# Patient Record
Sex: FEMALE | Race: BLACK OR AFRICAN AMERICAN | NOT HISPANIC OR LATINO | Employment: PART TIME | URBAN - METROPOLITAN AREA
[De-identification: names, ages, dates, MRNs, and addresses within clinical notes are randomized per-mention and may not be internally consistent; named-entity substitution may affect disease eponyms.]

---

## 2020-03-23 ENCOUNTER — PROCEDURE VISIT (OUTPATIENT)
Dept: OBSTETRICS AND GYNECOLOGY | Facility: CLINIC | Age: 30
End: 2020-03-23

## 2020-03-23 ENCOUNTER — INITIAL PRENATAL (OUTPATIENT)
Dept: OBSTETRICS AND GYNECOLOGY | Facility: CLINIC | Age: 30
End: 2020-03-23

## 2020-03-23 VITALS — WEIGHT: 137 LBS | SYSTOLIC BLOOD PRESSURE: 112 MMHG | DIASTOLIC BLOOD PRESSURE: 74 MMHG

## 2020-03-23 DIAGNOSIS — Z34.81 MULTIGRAVIDA IN FIRST TRIMESTER: Primary | ICD-10-CM

## 2020-03-23 DIAGNOSIS — Z98.891 PREVIOUS CESAREAN SECTION: ICD-10-CM

## 2020-03-23 DIAGNOSIS — Z3A.08 8 WEEKS GESTATION OF PREGNANCY: ICD-10-CM

## 2020-03-23 DIAGNOSIS — O36.80X0 ENCOUNTER TO DETERMINE FETAL VIABILITY OF PREGNANCY, SINGLE OR UNSPECIFIED FETUS: Primary | ICD-10-CM

## 2020-03-23 LAB
GLUCOSE UR STRIP-MCNC: NEGATIVE MG/DL
PROT UR STRIP-MCNC: ABNORMAL MG/DL

## 2020-03-23 PROCEDURE — 99203 OFFICE O/P NEW LOW 30 MIN: CPT | Performed by: OBSTETRICS & GYNECOLOGY

## 2020-03-23 PROCEDURE — 76817 TRANSVAGINAL US OBSTETRIC: CPT | Performed by: OBSTETRICS & GYNECOLOGY

## 2020-03-23 RX ORDER — SWAB
1 SWAB, NON-MEDICATED MISCELLANEOUS DAILY
COMMUNITY
Start: 2020-02-29 | End: 2020-03-31 | Stop reason: SDUPTHER

## 2020-03-23 NOTE — PROGRESS NOTES
Cc:  New pregnancy visit  Patient had regular cycles and missed menses.  Home testing was positive.  She was not intending to become pregnant.  She described cramping and pain and went to ER at end of February at [facility].  Ultrasound did not reveal definitive pregnancy and she was instructed to follow up.  She has no complaints today.  Past medical, surgical, obstetrical, genetic, social and family histories reviewed.  Allergies and medications reviewed.  10 point ROS reviewed and all pertinent negative.  Physical exam documented in chart.  Ultrasound with viable 1st trimester pregnancy  Labs ordered.  A/P:  IUP at 8 weeks with previous  times 4  - C/S.  Patient had unexpected  with  birth.  Patient wants to proceed with C/S and tubal during this pregnancy.  - Discussed set up of our practice, timing of ultrasound and nutrition.    I spent greater than 35 minutes with patient/chart review and greater than 20 minutes in counseling face to face on above issues.

## 2020-03-24 ENCOUNTER — TELEPHONE (OUTPATIENT)
Dept: OBSTETRICS AND GYNECOLOGY | Facility: CLINIC | Age: 30
End: 2020-03-24

## 2020-03-24 LAB
ABO GROUP BLD: ABNORMAL
BASOPHILS # BLD AUTO: 0 X10E3/UL (ref 0–0.2)
BASOPHILS NFR BLD AUTO: 0 %
BLD GP AB SCN SERPL QL: NEGATIVE
C TRACH RRNA SPEC QL NAA+PROBE: NEGATIVE
CONV .: NORMAL
CYTOLOGIST CVX/VAG CYTO: NORMAL
CYTOLOGY CVX/VAG DOC CYTO: NORMAL
CYTOLOGY CVX/VAG DOC THIN PREP: NORMAL
DX ICD CODE: NORMAL
EOSINOPHIL # BLD AUTO: 0.5 X10E3/UL (ref 0–0.4)
EOSINOPHIL NFR BLD AUTO: 3 %
ERYTHROCYTE [DISTWIDTH] IN BLOOD BY AUTOMATED COUNT: 15.3 % (ref 11.7–15.4)
HBV SURFACE AG SERPL QL IA: NEGATIVE
HCT VFR BLD AUTO: 40.8 % (ref 34–46.6)
HCV AB S/CO SERPL IA: <0.1 S/CO RATIO (ref 0–0.9)
HGB BLD-MCNC: 12.6 G/DL (ref 11.1–15.9)
HGB S BLD QL SOLY: NEGATIVE
HIV 1 & 2 AB SER-IMP: NORMAL
HIV 1+2 AB+HIV1 P24 AG SERPL QL IA: NON REACTIVE
IMM GRANULOCYTES # BLD AUTO: 0.1 X10E3/UL (ref 0–0.1)
IMM GRANULOCYTES NFR BLD AUTO: 1 %
LYMPHOCYTES # BLD AUTO: 1.6 X10E3/UL (ref 0.7–3.1)
LYMPHOCYTES NFR BLD AUTO: 12 %
MCH RBC QN AUTO: 24.7 PG (ref 26.6–33)
MCHC RBC AUTO-ENTMCNC: 30.9 G/DL (ref 31.5–35.7)
MCV RBC AUTO: 80 FL (ref 79–97)
MONOCYTES # BLD AUTO: 0.8 X10E3/UL (ref 0.1–0.9)
MONOCYTES NFR BLD AUTO: 6 %
N GONORRHOEA RRNA SPEC QL NAA+PROBE: NEGATIVE
NEUTROPHILS # BLD AUTO: 10.3 X10E3/UL (ref 1.4–7)
NEUTROPHILS NFR BLD AUTO: 78 %
OTHER STN SPEC: NORMAL
PLATELET # BLD AUTO: 333 X10E3/UL (ref 150–450)
RBC # BLD AUTO: 5.11 X10E6/UL (ref 3.77–5.28)
RH BLD: POSITIVE
RPR SER QL: NON REACTIVE
RUBV IGG SERPL IA-ACNC: 5.24 INDEX
STAT OF ADQ CVX/VAG CYTO-IMP: NORMAL
T VAGINALIS DNA SPEC QL NAA+PROBE: NEGATIVE
WBC # BLD AUTO: 13.3 X10E3/UL (ref 3.4–10.8)

## 2020-03-24 NOTE — TELEPHONE ENCOUNTER
This was added on, also made patient aware we will await results before anything could be prescribed, patient voiced understanding. 3-/lw

## 2020-03-24 NOTE — TELEPHONE ENCOUNTER
8 weeks pregnant. Seen yesterday. She feel like she may have yeast infection or BV. Odor and discharge.Pharm in system. Pt Ph [phone]

## 2020-03-25 LAB
BACTERIA UR CULT: NORMAL
BACTERIA UR CULT: NORMAL

## 2020-03-26 LAB
AMPHETAMINES UR QL SCN: NEGATIVE NG/ML
BARBITURATES UR QL SCN: NEGATIVE NG/ML
BENZODIAZ UR QL: NEGATIVE NG/ML
BZE UR QL: NEGATIVE NG/ML
CANNABINOIDS UR CFM-MCNC: POSITIVE NG/ML
METHADONE UR QL SCN: NEGATIVE NG/ML
OPIATES UR QL: NEGATIVE NG/ML
PCP UR QL: NEGATIVE NG/ML
PROPOXYPH UR QL SCN: NEGATIVE NG/ML
SPECIMEN STATUS: NORMAL

## 2020-03-30 ENCOUNTER — TELEPHONE (OUTPATIENT)
Dept: OBSTETRICS AND GYNECOLOGY | Facility: CLINIC | Age: 30
End: 2020-03-30

## 2020-03-30 LAB
A VAGINAE DNA VAG QL NAA+PROBE: ABNORMAL SCORE
BVAB2 DNA VAG QL NAA+PROBE: ABNORMAL SCORE
C ALBICANS DNA VAG QL NAA+PROBE: NEGATIVE
C GLABRATA DNA VAG QL NAA+PROBE: NEGATIVE
MEGA1 DNA VAG QL NAA+PROBE: ABNORMAL SCORE

## 2020-03-30 NOTE — TELEPHONE ENCOUNTER
Emily    Let her know that she has a bacterial infection.  I called in antibiotics.    Thanks    Yakelin

## 2020-03-31 RX ORDER — METRONIDAZOLE 500 MG/1
500 TABLET ORAL 2 TIMES DAILY
Qty: 14 TABLET | Refills: 0 | Status: SHIPPED | OUTPATIENT
Start: 2020-03-31 | End: 2020-04-27

## 2020-03-31 RX ORDER — SWAB
1 SWAB, NON-MEDICATED MISCELLANEOUS DAILY
Qty: 30 EACH | Refills: 7 | Status: SHIPPED | OUTPATIENT
Start: 2020-03-31 | End: 2020-04-27

## 2020-03-31 NOTE — TELEPHONE ENCOUNTER
Patient said there was no prescription at her pharmacy when she went to  today. I seen the message there was but could not see one sent under the meds tab. Patient wanted to know if it could be resent and if she could also have prenatals sent to?

## 2020-04-13 ENCOUNTER — ROUTINE PRENATAL (OUTPATIENT)
Dept: OBSTETRICS AND GYNECOLOGY | Facility: CLINIC | Age: 30
End: 2020-04-13

## 2020-04-13 ENCOUNTER — PROCEDURE VISIT (OUTPATIENT)
Dept: OBSTETRICS AND GYNECOLOGY | Facility: CLINIC | Age: 30
End: 2020-04-13

## 2020-04-13 VITALS — WEIGHT: 136 LBS | SYSTOLIC BLOOD PRESSURE: 110 MMHG | DIASTOLIC BLOOD PRESSURE: 70 MMHG

## 2020-04-13 DIAGNOSIS — O41.8X10 SUBCHORIONIC HEMORRHAGE OF PLACENTA IN FIRST TRIMESTER, SINGLE OR UNSPECIFIED FETUS: ICD-10-CM

## 2020-04-13 DIAGNOSIS — Z34.81 MULTIGRAVIDA IN FIRST TRIMESTER: Primary | ICD-10-CM

## 2020-04-13 DIAGNOSIS — O46.8X1 SUBCHORIONIC HEMATOMA IN FIRST TRIMESTER, SINGLE OR UNSPECIFIED FETUS: ICD-10-CM

## 2020-04-13 DIAGNOSIS — Z3A.11 11 WEEKS GESTATION OF PREGNANCY: ICD-10-CM

## 2020-04-13 DIAGNOSIS — Z98.891 PREVIOUS CESAREAN SECTION: ICD-10-CM

## 2020-04-13 DIAGNOSIS — O46.8X1 SUBCHORIONIC HEMORRHAGE OF PLACENTA IN FIRST TRIMESTER, SINGLE OR UNSPECIFIED FETUS: ICD-10-CM

## 2020-04-13 DIAGNOSIS — O36.80X0 ENCOUNTER TO DETERMINE FETAL VIABILITY OF PREGNANCY, SINGLE OR UNSPECIFIED FETUS: Primary | ICD-10-CM

## 2020-04-13 DIAGNOSIS — O20.9 VAGINAL BLEEDING AFFECTING EARLY PREGNANCY: ICD-10-CM

## 2020-04-13 DIAGNOSIS — O41.8X10 SUBCHORIONIC HEMATOMA IN FIRST TRIMESTER, SINGLE OR UNSPECIFIED FETUS: ICD-10-CM

## 2020-04-13 LAB
GLUCOSE UR STRIP-MCNC: NEGATIVE MG/DL
PROT UR STRIP-MCNC: ABNORMAL MG/DL

## 2020-04-13 PROCEDURE — 99213 OFFICE O/P EST LOW 20 MIN: CPT | Performed by: OBSTETRICS & GYNECOLOGY

## 2020-04-13 PROCEDURE — 76801 OB US < 14 WKS SINGLE FETUS: CPT | Performed by: OBSTETRICS & GYNECOLOGY

## 2020-04-13 NOTE — PROGRESS NOTES
Cc:  Pregnancy follow up.  Patient states she noticed some light spotting upon wiping after using the restroom last week, nothing since then.  Spotting only lasted for less than 24 hours.  No heavy bleeding.  No cramping or pain.  Patient feels well with no cough or shortness of air.  Vitals reviewed by me.  Gen - alert and pleasant.  Abdomen - gravid, nontender, no guarding or rebound.  Ultrasound with subchorionic hemorrhage in lower uterine segment.  Viability noted.  A/P:  IUP at 11 weeks with subchorionic hemorrhage/first trimester bleeding.  - Reassurance given.  Discussed factors that are associated with first trimester bleeding/hematoma, such as smoking, drug use, HTN.  Will reassess in 4 weeks.  - Maternal well being discussed.

## 2020-04-14 PROBLEM — O46.8X1 SUBCHORIONIC HEMORRHAGE OF PLACENTA IN FIRST TRIMESTER, SINGLE OR UNSPECIFIED FETUS: Status: ACTIVE | Noted: 2020-04-14

## 2020-04-14 PROBLEM — Z98.891 PREVIOUS CESAREAN SECTION: Status: ACTIVE | Noted: 2020-04-14

## 2020-04-14 PROBLEM — O41.8X10 SUBCHORIONIC HEMORRHAGE OF PLACENTA IN FIRST TRIMESTER, SINGLE OR UNSPECIFIED FETUS: Status: ACTIVE | Noted: 2020-04-14

## 2020-04-24 ENCOUNTER — TELEPHONE (OUTPATIENT)
Dept: OBSTETRICS AND GYNECOLOGY | Facility: CLINIC | Age: 30
End: 2020-04-24

## 2020-04-24 NOTE — TELEPHONE ENCOUNTER
"Marzena.    Please call her and remind her that her ultrasound shows a \"bruise\" in the placenta. This is called a subchorionic hematoma -- that is the medical term for it.  It is associated with marijuana use in early part of pregnancy.   (We went over this during her recent ultrasound.)  This will cause some light spotting.  There is nothing to do for it.  She has a follow up sonogram to check it in the upcoming weeks.    If she does have bleeding that is over a pad every 30 to 60 minutes, that is not normal and she will need to come to the office or go to ER.    Thanks    Yakelin  "

## 2020-04-24 NOTE — TELEPHONE ENCOUNTER
Patient states that she thinks that she might have a bacterial infection. She states that when she spots, there is a slight odor when she wipes and when she is not spotting there is a slight color to her discharge. She states there is no burning or itching. States that the smell exists even when she is not spotting now and it is slowly progressing. She hasn't had intercourse in over a week

## 2020-04-24 NOTE — TELEPHONE ENCOUNTER
Marzena    Please let her know that I recommend she be tested for infection first.  The purpose is to limit exposure to medications during fetal development.    She can be worked into my scheduled on Monday or Tuesday.    Thanks    Yakelin

## 2020-04-24 NOTE — TELEPHONE ENCOUNTER
Patient would not go into detail but would like to speak to someone about her spotting. If someone could give her a call back please

## 2020-04-27 ENCOUNTER — ROUTINE PRENATAL (OUTPATIENT)
Dept: OBSTETRICS AND GYNECOLOGY | Facility: CLINIC | Age: 30
End: 2020-04-27

## 2020-04-27 ENCOUNTER — PROCEDURE VISIT (OUTPATIENT)
Dept: OBSTETRICS AND GYNECOLOGY | Facility: CLINIC | Age: 30
End: 2020-04-27

## 2020-04-27 VITALS — WEIGHT: 140 LBS | SYSTOLIC BLOOD PRESSURE: 122 MMHG | DIASTOLIC BLOOD PRESSURE: 68 MMHG

## 2020-04-27 DIAGNOSIS — Z34.81 MULTIGRAVIDA IN FIRST TRIMESTER: ICD-10-CM

## 2020-04-27 DIAGNOSIS — O26.891 VAGINAL DISCHARGE DURING PREGNANCY IN FIRST TRIMESTER: ICD-10-CM

## 2020-04-27 DIAGNOSIS — O41.8X10 SUBCHORIONIC HEMORRHAGE OF PLACENTA IN FIRST TRIMESTER, SINGLE OR UNSPECIFIED FETUS: Primary | ICD-10-CM

## 2020-04-27 DIAGNOSIS — O36.80X0 ENCOUNTER TO DETERMINE FETAL VIABILITY OF PREGNANCY, SINGLE OR UNSPECIFIED FETUS: Primary | ICD-10-CM

## 2020-04-27 DIAGNOSIS — O46.8X1 SUBCHORIONIC HEMORRHAGE OF PLACENTA IN FIRST TRIMESTER, SINGLE OR UNSPECIFIED FETUS: Primary | ICD-10-CM

## 2020-04-27 DIAGNOSIS — Z98.891 PREVIOUS CESAREAN SECTION: ICD-10-CM

## 2020-04-27 DIAGNOSIS — N89.8 VAGINAL DISCHARGE DURING PREGNANCY IN FIRST TRIMESTER: ICD-10-CM

## 2020-04-27 DIAGNOSIS — O20.9 VAGINAL BLEEDING IN PREGNANCY, FIRST TRIMESTER: ICD-10-CM

## 2020-04-27 DIAGNOSIS — Z3A.13 13 WEEKS GESTATION OF PREGNANCY: ICD-10-CM

## 2020-04-27 LAB
BILIRUB BLD-MCNC: NEGATIVE MG/DL
GLUCOSE UR STRIP-MCNC: NEGATIVE MG/DL
GLUCOSE UR STRIP-MCNC: NEGATIVE MG/DL
KETONES UR QL: NEGATIVE
LEUKOCYTE EST, POC: NEGATIVE
NITRITE UR-MCNC: NEGATIVE MG/ML
PH UR: 7 [PH] (ref 5–8)
PROT UR STRIP-MCNC: ABNORMAL MG/DL
PROT UR STRIP-MCNC: NEGATIVE MG/DL
RBC # UR STRIP: NEGATIVE /UL
SP GR UR: 1.02 (ref 1–1.03)
UROBILINOGEN UR QL: NORMAL

## 2020-04-27 PROCEDURE — 76817 TRANSVAGINAL US OBSTETRIC: CPT | Performed by: OBSTETRICS & GYNECOLOGY

## 2020-04-27 PROCEDURE — 99214 OFFICE O/P EST MOD 30 MIN: CPT | Performed by: OBSTETRICS & GYNECOLOGY

## 2020-04-27 RX ORDER — PRENATAL WITH FERROUS FUM AND FOLIC ACID 3080; 920; 120; 400; 22; 1.84; 3; 20; 10; 1; 12; 200; 27; 25; 2 [IU]/1; [IU]/1; MG/1; [IU]/1; MG/1; MG/1; MG/1; MG/1; MG/1; MG/1; UG/1; MG/1; MG/1; MG/1; MG/1
TABLET ORAL
COMMUNITY
Start: 2020-04-23

## 2020-04-27 NOTE — PROGRESS NOTES
Cc:  Pregnancy follow up.  Patient c/o brown to light pink spotting with odor along with right sided cramping and discomfort in her back.  This began approximately 5 days ago.  She has not been sexually active in over a week.  She has a history of a subchorionic hematoma.  Bleeding is not heavy.  No frequency of urination.  No fevers or chills.  Some vaginal discharge.  Vitals reviewed by me.  Gen - alert and pleasant.  Abdomen - gravid, nontender, no guarding or rebound.  Pelvic - cervix close.  White discharge noted.  No blood in vault.  No tenderness to uterus or adnexa on exam.  CC U/A negative.  Urine sent for culture.  Vaginal swab done.  Ultrasound with resolved hematoma.  Viable pregnancy noted.  A/P:  IUP at 13 weeks with first trimester bleeding/hematoma, gestational discharge.  - Bleeding/Hematoma.  Resolved.  Next ultrasound with be at approximately 18 to 20 weeks.  - Discharge.  Await cultures.  - Discussed maternal well being.

## 2020-04-28 LAB
BACTERIA UR CULT: NO GROWTH
BACTERIA UR CULT: NORMAL

## 2020-05-05 ENCOUNTER — TELEPHONE (OUTPATIENT)
Dept: OBSTETRICS AND GYNECOLOGY | Facility: CLINIC | Age: 30
End: 2020-05-05

## 2020-05-05 LAB
A VAGINAE DNA VAG QL NAA+PROBE: ABNORMAL SCORE
BVAB2 DNA VAG QL NAA+PROBE: ABNORMAL SCORE
C ALBICANS DNA VAG QL NAA+PROBE: NEGATIVE
C GLABRATA DNA VAG QL NAA+PROBE: NEGATIVE
C TRACH DNA VAG QL NAA+PROBE: NEGATIVE
MEGA1 DNA VAG QL NAA+PROBE: ABNORMAL SCORE
N GONORRHOEA DNA VAG QL NAA+PROBE: NEGATIVE
T VAGINALIS DNA VAG QL NAA+PROBE: NEGATIVE

## 2020-05-05 RX ORDER — METRONIDAZOLE 500 MG/1
500 TABLET ORAL 2 TIMES DAILY
Qty: 14 TABLET | Refills: 0 | Status: SHIPPED | OUTPATIENT
Start: 2020-05-05 | End: 2020-05-12

## 2020-05-05 NOTE — TELEPHONE ENCOUNTER
Jackie    Please let her know that she has a bacterial infection.  I called in Flagyl.    Thanks    Yakelin

## 2020-05-28 ENCOUNTER — ROUTINE PRENATAL (OUTPATIENT)
Dept: OBSTETRICS AND GYNECOLOGY | Facility: CLINIC | Age: 30
End: 2020-05-28

## 2020-05-28 ENCOUNTER — PROCEDURE VISIT (OUTPATIENT)
Dept: OBSTETRICS AND GYNECOLOGY | Facility: CLINIC | Age: 30
End: 2020-05-28

## 2020-05-28 VITALS — WEIGHT: 148 LBS | DIASTOLIC BLOOD PRESSURE: 72 MMHG | SYSTOLIC BLOOD PRESSURE: 118 MMHG

## 2020-05-28 DIAGNOSIS — Z34.82 MULTIGRAVIDA IN SECOND TRIMESTER: ICD-10-CM

## 2020-05-28 DIAGNOSIS — Z36.89 ENCOUNTER FOR FETAL ANATOMIC SURVEY: Primary | ICD-10-CM

## 2020-05-28 DIAGNOSIS — O41.8X10 SUBCHORIONIC HEMORRHAGE OF PLACENTA IN FIRST TRIMESTER, SINGLE OR UNSPECIFIED FETUS: Primary | ICD-10-CM

## 2020-05-28 DIAGNOSIS — O46.8X1 SUBCHORIONIC HEMORRHAGE OF PLACENTA IN FIRST TRIMESTER, SINGLE OR UNSPECIFIED FETUS: Primary | ICD-10-CM

## 2020-05-28 DIAGNOSIS — Z3A.18 18 WEEKS GESTATION OF PREGNANCY: ICD-10-CM

## 2020-05-28 DIAGNOSIS — Z98.891 PREVIOUS CESAREAN SECTION: ICD-10-CM

## 2020-05-28 LAB
GLUCOSE UR STRIP-MCNC: NEGATIVE MG/DL
PROT UR STRIP-MCNC: ABNORMAL MG/DL

## 2020-05-28 PROCEDURE — 76805 OB US >/= 14 WKS SNGL FETUS: CPT | Performed by: OBSTETRICS & GYNECOLOGY

## 2020-05-28 PROCEDURE — 99213 OFFICE O/P EST LOW 20 MIN: CPT | Performed by: OBSTETRICS & GYNECOLOGY

## 2020-05-28 NOTE — PROGRESS NOTES
Cc:  Pregnancy follow up.  Patient doing well.  No bleeding or spotting.  No significant abdominal pain.  Hydrating well and taking vitamins.  She is perceiving some flutters.  Vitals reviewed by me.  Gen - alert and pleasant.  Abdomen - gravid, nontender, no guarding or rebound.  Ultrasound for anatomy ordered.  A/P:  IUP at 18 weeks with history of subchorionic hemorrhage.  - No further bleeding.  Sonogram for anatomy later today.  - Discussed follow up and maternal well being.

## 2020-06-25 ENCOUNTER — ROUTINE PRENATAL (OUTPATIENT)
Dept: OBSTETRICS AND GYNECOLOGY | Facility: CLINIC | Age: 30
End: 2020-06-25

## 2020-06-25 VITALS — WEIGHT: 154 LBS | SYSTOLIC BLOOD PRESSURE: 110 MMHG | DIASTOLIC BLOOD PRESSURE: 68 MMHG

## 2020-06-25 DIAGNOSIS — Z98.891 PREVIOUS CESAREAN SECTION: ICD-10-CM

## 2020-06-25 DIAGNOSIS — Z3A.22 22 WEEKS GESTATION OF PREGNANCY: ICD-10-CM

## 2020-06-25 DIAGNOSIS — Z34.82 MULTIGRAVIDA IN SECOND TRIMESTER: ICD-10-CM

## 2020-06-25 DIAGNOSIS — O46.8X1 SUBCHORIONIC HEMORRHAGE OF PLACENTA IN FIRST TRIMESTER, SINGLE OR UNSPECIFIED FETUS: Primary | ICD-10-CM

## 2020-06-25 DIAGNOSIS — O41.8X10 SUBCHORIONIC HEMORRHAGE OF PLACENTA IN FIRST TRIMESTER, SINGLE OR UNSPECIFIED FETUS: Primary | ICD-10-CM

## 2020-06-25 LAB
GLUCOSE UR STRIP-MCNC: NEGATIVE MG/DL
PROT UR STRIP-MCNC: ABNORMAL MG/DL

## 2020-06-25 PROCEDURE — 99213 OFFICE O/P EST LOW 20 MIN: CPT | Performed by: OBSTETRICS & GYNECOLOGY

## 2020-06-25 NOTE — PROGRESS NOTES
Cc:  Pregnancy follow up.  Patient does not have any complaints.  She feels well and reports good FM.  No bleeding or spotting.  No major cramping or pain.  She is hydrating well and taking vitamins.  Vitals reviewed by me.  Gen - alert and pleasant.  Abdomen - gravid, nontender, no guarding or rebound.  Gtt1 for next visit.  A/P:  IUP at 22 weeks with subchorionic hemorrhage  - Discussed maternal well being and intent of glucola testing.

## 2020-08-07 ENCOUNTER — ROUTINE PRENATAL (OUTPATIENT)
Dept: OBSTETRICS AND GYNECOLOGY | Facility: CLINIC | Age: 30
End: 2020-08-07

## 2020-08-07 VITALS — WEIGHT: 164 LBS | SYSTOLIC BLOOD PRESSURE: 122 MMHG | DIASTOLIC BLOOD PRESSURE: 76 MMHG

## 2020-08-07 DIAGNOSIS — O26.843 FUNDAL HEIGHT LOW FOR DATES IN THIRD TRIMESTER: ICD-10-CM

## 2020-08-07 DIAGNOSIS — Z98.891 PREVIOUS CESAREAN SECTION: Primary | ICD-10-CM

## 2020-08-07 DIAGNOSIS — Z3A.28 28 WEEKS GESTATION OF PREGNANCY: ICD-10-CM

## 2020-08-07 DIAGNOSIS — Z34.83 MULTIGRAVIDA IN THIRD TRIMESTER: ICD-10-CM

## 2020-08-07 LAB
GLUCOSE UR STRIP-MCNC: NEGATIVE MG/DL
PROT UR STRIP-MCNC: ABNORMAL MG/DL

## 2020-08-07 PROCEDURE — 99213 OFFICE O/P EST LOW 20 MIN: CPT | Performed by: OBSTETRICS & GYNECOLOGY

## 2020-08-07 NOTE — PROGRESS NOTES
Cc:  Pregnancy follow up.  Patient with c/o trouble with sleeping/postionig of the baby along with recent noticeable pelvic discomfort.  Symptoms are mild.  No bleeding or spotting.  No leakage.  Good FM.  Vitals reviewed by me.  Gen - alert and pleasant.  Abdomen - gravid, small ventral hernia noted.  Glucola today.  A/P:  IUP at 28 weeks with size less than dates, previous .  - Fundal height low.  Sonogram next visit.  - Previous C/S.  Plan at 39 weeks unless warranted by maternal/fetal conditions.  - Maternal well being discussed.

## 2020-08-08 LAB — GLUCOSE 1H P 50 G GLC PO SERPL-MCNC: 111 MG/DL (ref 65–139)

## 2020-08-21 ENCOUNTER — PROCEDURE VISIT (OUTPATIENT)
Dept: OBSTETRICS AND GYNECOLOGY | Facility: CLINIC | Age: 30
End: 2020-08-21

## 2020-08-21 ENCOUNTER — ROUTINE PRENATAL (OUTPATIENT)
Dept: OBSTETRICS AND GYNECOLOGY | Facility: CLINIC | Age: 30
End: 2020-08-21

## 2020-08-21 VITALS — SYSTOLIC BLOOD PRESSURE: 110 MMHG | WEIGHT: 163 LBS | DIASTOLIC BLOOD PRESSURE: 62 MMHG

## 2020-08-21 DIAGNOSIS — O26.843 FUNDAL HEIGHT LOW FOR DATES IN THIRD TRIMESTER: ICD-10-CM

## 2020-08-21 DIAGNOSIS — Z98.891 PREVIOUS CESAREAN SECTION: Primary | ICD-10-CM

## 2020-08-21 DIAGNOSIS — O23.43 UTI IN PREGNANCY, ANTEPARTUM, THIRD TRIMESTER: ICD-10-CM

## 2020-08-21 DIAGNOSIS — Z3A.30 30 WEEKS GESTATION OF PREGNANCY: ICD-10-CM

## 2020-08-21 DIAGNOSIS — O36.5939 SGA (SMALL FOR GESTATIONAL AGE), FETAL, AFFECTING CARE OF MOTHER, ANTEPARTUM, THIRD TRIMESTER, OTHER FETUS: ICD-10-CM

## 2020-08-21 DIAGNOSIS — Z34.83 MULTIGRAVIDA IN THIRD TRIMESTER: ICD-10-CM

## 2020-08-21 DIAGNOSIS — Z36.89 ENCOUNTER FOR ULTRASOUND TO CHECK FETAL GROWTH: Primary | ICD-10-CM

## 2020-08-21 LAB
BILIRUB BLD-MCNC: NEGATIVE MG/DL
GLUCOSE UR STRIP-MCNC: NEGATIVE MG/DL
GLUCOSE UR STRIP-MCNC: NEGATIVE MG/DL
KETONES UR QL: NEGATIVE
LEUKOCYTE EST, POC: ABNORMAL
NITRITE UR-MCNC: NEGATIVE MG/ML
PH UR: 7.5 [PH] (ref 5–8)
PROT UR STRIP-MCNC: ABNORMAL MG/DL
PROT UR STRIP-MCNC: NEGATIVE MG/DL
RBC # UR STRIP: NEGATIVE /UL
SP GR UR: 1.02 (ref 1–1.03)
UROBILINOGEN UR QL: NORMAL

## 2020-08-21 PROCEDURE — 99214 OFFICE O/P EST MOD 30 MIN: CPT | Performed by: OBSTETRICS & GYNECOLOGY

## 2020-08-21 PROCEDURE — 76816 OB US FOLLOW-UP PER FETUS: CPT | Performed by: OBSTETRICS & GYNECOLOGY

## 2020-08-21 RX ORDER — CEPHALEXIN 500 MG/1
500 CAPSULE ORAL 3 TIMES DAILY
Qty: 9 CAPSULE | Refills: 0 | Status: SHIPPED | OUTPATIENT
Start: 2020-08-21 | End: 2020-08-24

## 2020-08-21 NOTE — PROGRESS NOTES
"Cc:  Pregnancy follow up.  Patient c/o frequent urination with back pain and some pelvic discomfort.  Patient feels that back pain and pelvic pressure are related to \"growing pains.\"  However, she has to urinate often.  No blood in urine or dysuria.  She reports good FM.  No vaginal bleeding or leakage.  Vitals reviewed by me.  Gen - Alert and pleasant.  Abdomen - gravid, nontender, no guarding or rebound.  CC u/a Trace of Leukocytes.  Ultrasound with SGA with AC at 16th pecentile.  Infant breech.  A/P:  IUP at 30 weeks with probably UTI, SGA, previous C/S  - UTI.  Will treat empirically until culture returns.  Start Keflex.  - SGA.  Sonogram for growth.  Discussed importance of diet and nutrition.  - C/S.  Patient possibly interested in .  Will assess at 35 weeks.  Patient to sign tubal consents.    "

## 2020-08-23 LAB
BACTERIA UR CULT: NORMAL
BACTERIA UR CULT: NORMAL

## 2020-08-24 ENCOUNTER — TELEPHONE (OUTPATIENT)
Dept: OBSTETRICS AND GYNECOLOGY | Facility: CLINIC | Age: 30
End: 2020-08-24

## 2020-09-04 ENCOUNTER — ROUTINE PRENATAL (OUTPATIENT)
Dept: OBSTETRICS AND GYNECOLOGY | Facility: CLINIC | Age: 30
End: 2020-09-04

## 2020-09-04 VITALS — SYSTOLIC BLOOD PRESSURE: 110 MMHG | DIASTOLIC BLOOD PRESSURE: 68 MMHG | WEIGHT: 165 LBS

## 2020-09-04 DIAGNOSIS — Z98.891 PREVIOUS CESAREAN SECTION: Primary | ICD-10-CM

## 2020-09-04 DIAGNOSIS — Z3A.32 32 WEEKS GESTATION OF PREGNANCY: ICD-10-CM

## 2020-09-04 DIAGNOSIS — O36.5939 SGA (SMALL FOR GESTATIONAL AGE), FETAL, AFFECTING CARE OF MOTHER, ANTEPARTUM, THIRD TRIMESTER, OTHER FETUS: ICD-10-CM

## 2020-09-04 DIAGNOSIS — O99.891 BACK PAIN AFFECTING PREGNANCY IN THIRD TRIMESTER: ICD-10-CM

## 2020-09-04 DIAGNOSIS — M54.9 BACK PAIN AFFECTING PREGNANCY IN THIRD TRIMESTER: ICD-10-CM

## 2020-09-04 LAB
GLUCOSE UR STRIP-MCNC: NEGATIVE MG/DL
PROT UR STRIP-MCNC: ABNORMAL MG/DL

## 2020-09-04 PROCEDURE — 99214 OFFICE O/P EST MOD 30 MIN: CPT | Performed by: OBSTETRICS & GYNECOLOGY

## 2020-09-04 NOTE — PROGRESS NOTES
Cc:  Pregnancy follow up.  Patient with lower back pain.  Worse with activities.  She has not tried any therapies.  She reports excellent FM.  No bleeding or spotting.  No cramping/contractions.  Patient is hydrating well and taking vitamins.  Vitals reviewed by me  Gen - alert and pleasant.  Abdomen - gravid, nontender, no guarding or rebound.  A/P:  IUP at 32 weeks with SGA, back pain, breech history  - SGA/breech.  Sonogram for growth and position in 2 weeks.  - Back pain.  Discussed maternity belt, tylenol, heat and other symptomatic treatments.  - Maternal well being discussed.

## 2020-09-17 ENCOUNTER — ROUTINE PRENATAL (OUTPATIENT)
Dept: OBSTETRICS AND GYNECOLOGY | Facility: CLINIC | Age: 30
End: 2020-09-17

## 2020-09-17 VITALS — WEIGHT: 168 LBS | DIASTOLIC BLOOD PRESSURE: 74 MMHG | SYSTOLIC BLOOD PRESSURE: 118 MMHG

## 2020-09-17 DIAGNOSIS — Z3A.34 34 WEEKS GESTATION OF PREGNANCY: ICD-10-CM

## 2020-09-17 DIAGNOSIS — Z34.83 MULTIGRAVIDA IN THIRD TRIMESTER: ICD-10-CM

## 2020-09-17 DIAGNOSIS — Z98.891 PREVIOUS CESAREAN SECTION: Primary | ICD-10-CM

## 2020-09-17 DIAGNOSIS — O36.5931 IUGR (INTRAUTERINE GROWTH RESTRICTION) AFFECTING CARE OF MOTHER, THIRD TRIMESTER, FETUS 1: ICD-10-CM

## 2020-09-17 LAB
GLUCOSE UR STRIP-MCNC: NEGATIVE MG/DL
PROT UR STRIP-MCNC: ABNORMAL MG/DL

## 2020-09-17 PROCEDURE — 99214 OFFICE O/P EST MOD 30 MIN: CPT | Performed by: OBSTETRICS & GYNECOLOGY

## 2020-09-17 NOTE — PROGRESS NOTES
Cc:  Pregnancy follow up.  Patient with complaints of pelvic pressure.  No bleeding or spotting.  No leakage.  Fetal movement is excellent.  Patient reports that she is against .  Vitals reviewed by me.  Gen - alert and pleasant.  Abdomen - gravid, nontender, no guarding or rebound.  Ultrasound with asymmetric IUGR, AC at 6th percentile and normal BPP/ABDIFATAH.  A/P:  IUP at 34 weeks with Asymmetric IUGR, previous   - IUGR.  Delivery will likely occur between 38 and 39 weeks.  Patient to do weekly  testing including BPP and dopplers.  Discussed etiologies for IUGR.  Kick counts reviewed.  - Previous C/S.  This was scheduled with tubal sterilization.  - Discussed maternal well being.

## 2020-10-02 ENCOUNTER — ROUTINE PRENATAL (OUTPATIENT)
Dept: OBSTETRICS AND GYNECOLOGY | Facility: CLINIC | Age: 30
End: 2020-10-02

## 2020-10-02 VITALS — WEIGHT: 174 LBS | DIASTOLIC BLOOD PRESSURE: 80 MMHG | SYSTOLIC BLOOD PRESSURE: 124 MMHG

## 2020-10-02 DIAGNOSIS — Z3A.36 36 WEEKS GESTATION OF PREGNANCY: ICD-10-CM

## 2020-10-02 DIAGNOSIS — O36.5931 IUGR (INTRAUTERINE GROWTH RESTRICTION) AFFECTING CARE OF MOTHER, THIRD TRIMESTER, FETUS 1: ICD-10-CM

## 2020-10-02 DIAGNOSIS — Z34.83 MULTIGRAVIDA IN THIRD TRIMESTER: Primary | ICD-10-CM

## 2020-10-02 DIAGNOSIS — Z98.891 PREVIOUS CESAREAN SECTION: ICD-10-CM

## 2020-10-02 LAB
GLUCOSE UR STRIP-MCNC: NEGATIVE MG/DL
PROT UR STRIP-MCNC: ABNORMAL MG/DL

## 2020-10-02 PROCEDURE — 99214 OFFICE O/P EST MOD 30 MIN: CPT | Performed by: OBSTETRICS & GYNECOLOGY

## 2020-10-02 NOTE — PROGRESS NOTES
Cc:  Pregnancy follow up.  No major complaints.  Good FM.  No bleeding or spotting.  Patient hydrating well and taking vitamins.  Vitals reviewed by me.  Gen - alert and pleasant.  Abdomen - gravid, nontender, no guarding or rebound.  Ultrasound with normal BPP, ABDIFATAH and Dopplers.  Gbs done.  A/P:  IUP at 36 weeks with IUGR and previous .  - IUGR.  BPP and ABDIFATAH normal.  Fetal movement excellent.  Dopplers are normal but elevated.  Will proceed with delivery at 37 weeks and repeat testing next week.  - C/S.  Repeat C/S next week.  Discussed plan.  - Discussed kick counts and maternal well being.

## 2020-10-04 LAB — GP B STREP DNA SPEC QL NAA+PROBE: POSITIVE

## 2020-10-08 ENCOUNTER — ROUTINE PRENATAL (OUTPATIENT)
Dept: OBSTETRICS AND GYNECOLOGY | Facility: CLINIC | Age: 30
End: 2020-10-08

## 2020-10-08 VITALS — WEIGHT: 174 LBS | DIASTOLIC BLOOD PRESSURE: 64 MMHG | SYSTOLIC BLOOD PRESSURE: 128 MMHG

## 2020-10-08 DIAGNOSIS — Z34.83 MULTIGRAVIDA IN THIRD TRIMESTER: ICD-10-CM

## 2020-10-08 DIAGNOSIS — Z98.891 PREVIOUS CESAREAN SECTION: ICD-10-CM

## 2020-10-08 DIAGNOSIS — O36.5931 IUGR (INTRAUTERINE GROWTH RESTRICTION) AFFECTING CARE OF MOTHER, THIRD TRIMESTER, FETUS 1: Primary | ICD-10-CM

## 2020-10-08 DIAGNOSIS — Z3A.37 37 WEEKS GESTATION OF PREGNANCY: ICD-10-CM

## 2020-10-08 DIAGNOSIS — Z23 FLU VACCINE NEED: ICD-10-CM

## 2020-10-08 LAB
GLUCOSE UR STRIP-MCNC: NEGATIVE MG/DL
PROT UR STRIP-MCNC: ABNORMAL MG/DL

## 2020-10-08 PROCEDURE — 99214 OFFICE O/P EST MOD 30 MIN: CPT | Performed by: OBSTETRICS & GYNECOLOGY

## 2020-10-08 PROCEDURE — 90686 IIV4 VACC NO PRSV 0.5 ML IM: CPT | Performed by: OBSTETRICS & GYNECOLOGY

## 2020-10-08 PROCEDURE — 90471 IMMUNIZATION ADMIN: CPT | Performed by: OBSTETRICS & GYNECOLOGY

## 2020-10-08 NOTE — PROGRESS NOTES
Cc:  Pregnancy follow up.  Patient reports good FM.  No bleeding or spotting.  No major cramping/contractions.  No leakage.  Hydrating well.  Vitals reviewed by me.  Gen - alert and pleasant.  Abdomen - gravid, nontender, no guarding or rebound.  Ultrasound with normal BPP/ABDIFATAH.  Increased resistance noted on Dopplers >95th percentile.  Patient received Flu vaccine of right arm, office supplied, no reaction.  A/P:  IUP at 37 weeks with IUGR, previous C/S  - Will proceed with C/S tomorrow given increased cord dopplers.  Explained rationale.  - Discussed kick counts, maternal well being.

## 2020-10-09 ENCOUNTER — HOSPITAL ENCOUNTER (INPATIENT)
Facility: HOSPITAL | Age: 30
LOS: 4 days | Discharge: HOME | End: 2020-10-13
Attending: OBSTETRICS & GYNECOLOGY | Admitting: OBSTETRICS & GYNECOLOGY

## 2020-10-09 ENCOUNTER — ANESTHESIA (OUTPATIENT)
Dept: LABOR AND DELIVERY | Facility: HOSPITAL | Age: 30
End: 2020-10-09

## 2020-10-09 ENCOUNTER — ANESTHESIA EVENT (OUTPATIENT)
Dept: LABOR AND DELIVERY | Facility: HOSPITAL | Age: 30
End: 2020-10-09

## 2020-10-09 DIAGNOSIS — Z98.891 S/P REPEAT LOW TRANSVERSE C-SECTION: Primary | ICD-10-CM

## 2020-10-09 DIAGNOSIS — Z30.2 REQUEST FOR STERILIZATION: ICD-10-CM

## 2020-10-09 PROBLEM — Z34.90 PREGNANCY: Status: ACTIVE | Noted: 2020-10-09

## 2020-10-09 LAB
ABO GROUP BLD: NORMAL
AMPHET+METHAMPHET UR QL: NEGATIVE
BARBITURATES UR QL SCN: NEGATIVE
BASOPHILS # BLD AUTO: 0.02 10*3/MM3 (ref 0–0.2)
BASOPHILS # BLD AUTO: 0.02 10*3/MM3 (ref 0–0.2)
BASOPHILS NFR BLD AUTO: 0.3 % (ref 0–1.5)
BASOPHILS NFR BLD AUTO: 0.3 % (ref 0–1.5)
BENZODIAZ UR QL SCN: NEGATIVE
BLD GP AB SCN SERPL QL: NEGATIVE
CANNABINOIDS SERPL QL: NEGATIVE
COCAINE UR QL: NEGATIVE
DEPRECATED RDW RBC AUTO: 36.6 FL (ref 37–54)
DEPRECATED RDW RBC AUTO: 36.8 FL (ref 37–54)
EOSINOPHIL # BLD AUTO: 0.15 10*3/MM3 (ref 0–0.4)
EOSINOPHIL # BLD AUTO: 0.28 10*3/MM3 (ref 0–0.4)
EOSINOPHIL NFR BLD AUTO: 2.1 % (ref 0.3–6.2)
EOSINOPHIL NFR BLD AUTO: 3.7 % (ref 0.3–6.2)
ERYTHROCYTE [DISTWIDTH] IN BLOOD BY AUTOMATED COUNT: 12.1 % (ref 12.3–15.4)
ERYTHROCYTE [DISTWIDTH] IN BLOOD BY AUTOMATED COUNT: 12.2 % (ref 12.3–15.4)
HCT VFR BLD AUTO: 34.6 % (ref 34–46.6)
HCT VFR BLD AUTO: 37.2 % (ref 34–46.6)
HGB BLD-MCNC: 11.9 G/DL (ref 12–15.9)
HGB BLD-MCNC: 12.3 G/DL (ref 12–15.9)
IMM GRANULOCYTES # BLD AUTO: 0.07 10*3/MM3 (ref 0–0.05)
IMM GRANULOCYTES # BLD AUTO: 0.07 10*3/MM3 (ref 0–0.05)
IMM GRANULOCYTES NFR BLD AUTO: 0.9 % (ref 0–0.5)
IMM GRANULOCYTES NFR BLD AUTO: 1 % (ref 0–0.5)
LYMPHOCYTES # BLD AUTO: 0.85 10*3/MM3 (ref 0.7–3.1)
LYMPHOCYTES # BLD AUTO: 0.9 10*3/MM3 (ref 0.7–3.1)
LYMPHOCYTES NFR BLD AUTO: 11.6 % (ref 19.6–45.3)
LYMPHOCYTES NFR BLD AUTO: 12 % (ref 19.6–45.3)
MCH RBC QN AUTO: 27.5 PG (ref 26.6–33)
MCH RBC QN AUTO: 28.7 PG (ref 26.6–33)
MCHC RBC AUTO-ENTMCNC: 33.1 G/DL (ref 31.5–35.7)
MCHC RBC AUTO-ENTMCNC: 34.4 G/DL (ref 31.5–35.7)
MCV RBC AUTO: 83.2 FL (ref 79–97)
MCV RBC AUTO: 83.4 FL (ref 79–97)
METHADONE UR QL SCN: NEGATIVE
MONOCYTES # BLD AUTO: 0.49 10*3/MM3 (ref 0.1–0.9)
MONOCYTES # BLD AUTO: 0.73 10*3/MM3 (ref 0.1–0.9)
MONOCYTES NFR BLD AUTO: 6.7 % (ref 5–12)
MONOCYTES NFR BLD AUTO: 9.7 % (ref 5–12)
NEUTROPHILS NFR BLD AUTO: 5.52 10*3/MM3 (ref 1.7–7)
NEUTROPHILS NFR BLD AUTO: 5.72 10*3/MM3 (ref 1.7–7)
NEUTROPHILS NFR BLD AUTO: 73.4 % (ref 42.7–76)
NEUTROPHILS NFR BLD AUTO: 78.3 % (ref 42.7–76)
NRBC BLD AUTO-RTO: 0 /100 WBC (ref 0–0.2)
NRBC BLD AUTO-RTO: 0 /100 WBC (ref 0–0.2)
OPIATES UR QL: NEGATIVE
OXYCODONE UR QL SCN: NEGATIVE
PLATELET # BLD AUTO: 172 10*3/MM3 (ref 140–450)
PLATELET # BLD AUTO: 241 10*3/MM3 (ref 140–450)
PMV BLD AUTO: 9.2 FL (ref 6–12)
PMV BLD AUTO: 9.8 FL (ref 6–12)
RBC # BLD AUTO: 4.15 10*6/MM3 (ref 3.77–5.28)
RBC # BLD AUTO: 4.47 10*6/MM3 (ref 3.77–5.28)
RH BLD: POSITIVE
SARS-COV-2 RDRP RESP QL NAA+PROBE: NOT DETECTED
T&S EXPIRATION DATE: NORMAL
WBC # BLD AUTO: 7.3 10*3/MM3 (ref 3.4–10.8)
WBC # BLD AUTO: 7.52 10*3/MM3 (ref 3.4–10.8)

## 2020-10-09 PROCEDURE — 80307 DRUG TEST PRSMV CHEM ANLYZR: CPT | Performed by: OBSTETRICS & GYNECOLOGY

## 2020-10-09 PROCEDURE — 86900 BLOOD TYPING SEROLOGIC ABO: CPT | Performed by: OBSTETRICS & GYNECOLOGY

## 2020-10-09 PROCEDURE — 25010000002 DIPHENHYDRAMINE PER 50 MG: Performed by: NURSE ANESTHETIST, CERTIFIED REGISTERED

## 2020-10-09 PROCEDURE — 63710000001 DIPHENHYDRAMINE PER 50 MG: Performed by: OBSTETRICS & GYNECOLOGY

## 2020-10-09 PROCEDURE — 87635 SARS-COV-2 COVID-19 AMP PRB: CPT | Performed by: OBSTETRICS & GYNECOLOGY

## 2020-10-09 PROCEDURE — S0260 H&P FOR SURGERY: HCPCS | Performed by: OBSTETRICS & GYNECOLOGY

## 2020-10-09 PROCEDURE — 25010000002 HYDROMORPHONE PER 4 MG: Performed by: NURSE ANESTHETIST, CERTIFIED REGISTERED

## 2020-10-09 PROCEDURE — 85025 COMPLETE CBC W/AUTO DIFF WBC: CPT | Performed by: OBSTETRICS & GYNECOLOGY

## 2020-10-09 PROCEDURE — C9803 HOPD COVID-19 SPEC COLLECT: HCPCS | Performed by: OBSTETRICS & GYNECOLOGY

## 2020-10-09 PROCEDURE — 58611 LIGATE OVIDUCT(S) ADD-ON: CPT | Performed by: OBSTETRICS & GYNECOLOGY

## 2020-10-09 PROCEDURE — 86901 BLOOD TYPING SEROLOGIC RH(D): CPT | Performed by: OBSTETRICS & GYNECOLOGY

## 2020-10-09 PROCEDURE — 86850 RBC ANTIBODY SCREEN: CPT | Performed by: OBSTETRICS & GYNECOLOGY

## 2020-10-09 PROCEDURE — 59515 CESAREAN DELIVERY: CPT | Performed by: OBSTETRICS & GYNECOLOGY

## 2020-10-09 PROCEDURE — 25010000002 ONDANSETRON PER 1 MG: Performed by: ANESTHESIOLOGY

## 2020-10-09 PROCEDURE — 25010000002 PHENYLEPHRINE PER 1 ML: Performed by: NURSE ANESTHETIST, CERTIFIED REGISTERED

## 2020-10-09 PROCEDURE — 25010000003 CEFAZOLIN IN DEXTROSE 2-4 GM/100ML-% SOLUTION: Performed by: OBSTETRICS & GYNECOLOGY

## 2020-10-09 PROCEDURE — 0UB70ZZ EXCISION OF BILATERAL FALLOPIAN TUBES, OPEN APPROACH: ICD-10-PCS | Performed by: OBSTETRICS & GYNECOLOGY

## 2020-10-09 PROCEDURE — 88302 TISSUE EXAM BY PATHOLOGIST: CPT | Performed by: OBSTETRICS & GYNECOLOGY

## 2020-10-09 RX ORDER — SODIUM CHLORIDE, SODIUM LACTATE, POTASSIUM CHLORIDE, CALCIUM CHLORIDE 600; 310; 30; 20 MG/100ML; MG/100ML; MG/100ML; MG/100ML
125 INJECTION, SOLUTION INTRAVENOUS CONTINUOUS
Status: DISCONTINUED | OUTPATIENT
Start: 2020-10-09 | End: 2020-10-09

## 2020-10-09 RX ORDER — DIPHENHYDRAMINE HCL 25 MG
25 CAPSULE ORAL EVERY 4 HOURS PRN
Status: DISCONTINUED | OUTPATIENT
Start: 2020-10-09 | End: 2020-10-13 | Stop reason: HOSPADM

## 2020-10-09 RX ORDER — FAMOTIDINE 10 MG/ML
20 INJECTION, SOLUTION INTRAVENOUS ONCE AS NEEDED
Status: COMPLETED | OUTPATIENT
Start: 2020-10-09 | End: 2020-10-09

## 2020-10-09 RX ORDER — DIPHENHYDRAMINE HCL 25 MG
25 CAPSULE ORAL EVERY 4 HOURS PRN
Status: DISCONTINUED | OUTPATIENT
Start: 2020-10-09 | End: 2020-10-09

## 2020-10-09 RX ORDER — ERYTHROMYCIN 5 MG/G
OINTMENT OPHTHALMIC
Status: DISPENSED
Start: 2020-10-09 | End: 2020-10-10

## 2020-10-09 RX ORDER — IBUPROFEN 800 MG/1
800 TABLET ORAL EVERY 8 HOURS PRN
Status: DISCONTINUED | OUTPATIENT
Start: 2020-10-09 | End: 2020-10-13 | Stop reason: HOSPADM

## 2020-10-09 RX ORDER — DIPHENHYDRAMINE HYDROCHLORIDE 50 MG/ML
25 INJECTION INTRAMUSCULAR; INTRAVENOUS EVERY 4 HOURS PRN
Status: DISCONTINUED | OUTPATIENT
Start: 2020-10-09 | End: 2020-10-09

## 2020-10-09 RX ORDER — ACETAMINOPHEN 500 MG
1000 TABLET ORAL ONCE
Status: COMPLETED | OUTPATIENT
Start: 2020-10-09 | End: 2020-10-09

## 2020-10-09 RX ORDER — MISOPROSTOL 200 UG/1
800 TABLET ORAL AS NEEDED
Status: DISCONTINUED | OUTPATIENT
Start: 2020-10-09 | End: 2020-10-09 | Stop reason: HOSPADM

## 2020-10-09 RX ORDER — MORPHINE SULFATE 2 MG/ML
2 INJECTION, SOLUTION INTRAMUSCULAR; INTRAVENOUS
Status: DISCONTINUED | OUTPATIENT
Start: 2020-10-09 | End: 2020-10-09

## 2020-10-09 RX ORDER — HYDROXYZINE 50 MG/1
25 TABLET, FILM COATED ORAL EVERY 6 HOURS PRN
Status: DISCONTINUED | OUTPATIENT
Start: 2020-10-09 | End: 2020-10-13 | Stop reason: HOSPADM

## 2020-10-09 RX ORDER — EPHEDRINE SULFATE 50 MG/ML
INJECTION, SOLUTION INTRAVENOUS AS NEEDED
Status: DISCONTINUED | OUTPATIENT
Start: 2020-10-09 | End: 2020-10-09 | Stop reason: SURG

## 2020-10-09 RX ORDER — NALOXONE HCL 0.4 MG/ML
0.2 VIAL (ML) INJECTION
Status: DISCONTINUED | OUTPATIENT
Start: 2020-10-09 | End: 2020-10-09

## 2020-10-09 RX ORDER — HYDROCORTISONE 25 MG/G
CREAM TOPICAL 3 TIMES DAILY PRN
Status: DISCONTINUED | OUTPATIENT
Start: 2020-10-09 | End: 2020-10-13 | Stop reason: HOSPADM

## 2020-10-09 RX ORDER — ONDANSETRON 2 MG/ML
4 INJECTION INTRAMUSCULAR; INTRAVENOUS EVERY 6 HOURS PRN
Status: DISCONTINUED | OUTPATIENT
Start: 2020-10-09 | End: 2020-10-13 | Stop reason: HOSPADM

## 2020-10-09 RX ORDER — ONDANSETRON 4 MG/1
4 TABLET, FILM COATED ORAL EVERY 8 HOURS PRN
Status: DISCONTINUED | OUTPATIENT
Start: 2020-10-09 | End: 2020-10-13 | Stop reason: HOSPADM

## 2020-10-09 RX ORDER — METHYLERGONOVINE MALEATE 0.2 MG/ML
200 INJECTION INTRAVENOUS ONCE AS NEEDED
Status: DISCONTINUED | OUTPATIENT
Start: 2020-10-09 | End: 2020-10-09 | Stop reason: HOSPADM

## 2020-10-09 RX ORDER — OXYTOCIN-SODIUM CHLORIDE 0.9% IV SOLN 30 UNIT/500ML 30-0.9/5 UT/ML-%
250 SOLUTION INTRAVENOUS CONTINUOUS PRN
Status: ACTIVE | OUTPATIENT
Start: 2020-10-09 | End: 2020-10-09

## 2020-10-09 RX ORDER — ALUMINA, MAGNESIA, AND SIMETHICONE 2400; 2400; 240 MG/30ML; MG/30ML; MG/30ML
15 SUSPENSION ORAL EVERY 4 HOURS PRN
Status: DISCONTINUED | OUTPATIENT
Start: 2020-10-09 | End: 2020-10-13 | Stop reason: HOSPADM

## 2020-10-09 RX ORDER — SIMETHICONE 80 MG
80 TABLET,CHEWABLE ORAL 4 TIMES DAILY PRN
Status: DISCONTINUED | OUTPATIENT
Start: 2020-10-09 | End: 2020-10-13 | Stop reason: HOSPADM

## 2020-10-09 RX ORDER — CARBOPROST TROMETHAMINE 250 UG/ML
250 INJECTION, SOLUTION INTRAMUSCULAR AS NEEDED
Status: DISCONTINUED | OUTPATIENT
Start: 2020-10-09 | End: 2020-10-09 | Stop reason: HOSPADM

## 2020-10-09 RX ORDER — SODIUM CHLORIDE 0.9 % (FLUSH) 0.9 %
3 SYRINGE (ML) INJECTION EVERY 12 HOURS SCHEDULED
Status: DISCONTINUED | OUTPATIENT
Start: 2020-10-09 | End: 2020-10-09 | Stop reason: HOSPADM

## 2020-10-09 RX ORDER — ONDANSETRON 2 MG/ML
4 INJECTION INTRAMUSCULAR; INTRAVENOUS ONCE AS NEEDED
Status: DISCONTINUED | OUTPATIENT
Start: 2020-10-09 | End: 2020-10-09

## 2020-10-09 RX ORDER — HYDROMORPHONE HYDROCHLORIDE 1 MG/ML
0.5 INJECTION, SOLUTION INTRAMUSCULAR; INTRAVENOUS; SUBCUTANEOUS
Status: DISCONTINUED | OUTPATIENT
Start: 2020-10-09 | End: 2020-10-09 | Stop reason: HOSPADM

## 2020-10-09 RX ORDER — SODIUM CHLORIDE 0.9 % (FLUSH) 0.9 %
10 SYRINGE (ML) INJECTION AS NEEDED
Status: DISCONTINUED | OUTPATIENT
Start: 2020-10-09 | End: 2020-10-09 | Stop reason: HOSPADM

## 2020-10-09 RX ORDER — PRENATAL VIT/IRON FUM/FOLIC AC 27MG-0.8MG
1 TABLET ORAL DAILY
Status: DISCONTINUED | OUTPATIENT
Start: 2020-10-09 | End: 2020-10-13 | Stop reason: HOSPADM

## 2020-10-09 RX ORDER — ONDANSETRON 2 MG/ML
4 INJECTION INTRAMUSCULAR; INTRAVENOUS ONCE AS NEEDED
Status: COMPLETED | OUTPATIENT
Start: 2020-10-09 | End: 2020-10-09

## 2020-10-09 RX ORDER — LANOLIN
CREAM (ML) TOPICAL
Status: DISCONTINUED | OUTPATIENT
Start: 2020-10-09 | End: 2020-10-13 | Stop reason: HOSPADM

## 2020-10-09 RX ORDER — CEFAZOLIN SODIUM 2 G/100ML
2 INJECTION, SOLUTION INTRAVENOUS ONCE
Status: COMPLETED | OUTPATIENT
Start: 2020-10-09 | End: 2020-10-09

## 2020-10-09 RX ORDER — PHYTONADIONE 1 MG/.5ML
INJECTION, EMULSION INTRAMUSCULAR; INTRAVENOUS; SUBCUTANEOUS
Status: DISPENSED
Start: 2020-10-09 | End: 2020-10-10

## 2020-10-09 RX ORDER — OXYTOCIN-SODIUM CHLORIDE 0.9% IV SOLN 30 UNIT/500ML 30-0.9/5 UT/ML-%
125 SOLUTION INTRAVENOUS CONTINUOUS PRN
Status: COMPLETED | OUTPATIENT
Start: 2020-10-09 | End: 2020-10-09

## 2020-10-09 RX ORDER — CALCIUM CARBONATE 200(500)MG
2 TABLET,CHEWABLE ORAL 2 TIMES DAILY PRN
Status: DISCONTINUED | OUTPATIENT
Start: 2020-10-09 | End: 2020-10-13 | Stop reason: HOSPADM

## 2020-10-09 RX ORDER — ZOLPIDEM TARTRATE 5 MG/1
5 TABLET ORAL NIGHTLY PRN
Status: DISCONTINUED | OUTPATIENT
Start: 2020-10-09 | End: 2020-10-13 | Stop reason: HOSPADM

## 2020-10-09 RX ORDER — OXYTOCIN-SODIUM CHLORIDE 0.9% IV SOLN 30 UNIT/500ML 30-0.9/5 UT/ML-%
999 SOLUTION INTRAVENOUS ONCE
Status: DISCONTINUED | OUTPATIENT
Start: 2020-10-09 | End: 2020-10-09 | Stop reason: HOSPADM

## 2020-10-09 RX ORDER — OXYCODONE HYDROCHLORIDE AND ACETAMINOPHEN 5; 325 MG/1; MG/1
2 TABLET ORAL EVERY 6 HOURS PRN
Status: DISCONTINUED | OUTPATIENT
Start: 2020-10-09 | End: 2020-10-11

## 2020-10-09 RX ADMIN — PHENYLEPHRINE HYDROCHLORIDE 50 MCG: 10 INJECTION INTRAVENOUS at 12:10

## 2020-10-09 RX ADMIN — HYDROXYZINE HYDROCHLORIDE 25 MG: 50 TABLET ORAL at 17:31

## 2020-10-09 RX ADMIN — PHENYLEPHRINE HYDROCHLORIDE 100 MCG: 10 INJECTION INTRAVENOUS at 12:32

## 2020-10-09 RX ADMIN — PHENYLEPHRINE HYDROCHLORIDE 100 MCG: 10 INJECTION INTRAVENOUS at 12:27

## 2020-10-09 RX ADMIN — PHENYLEPHRINE HYDROCHLORIDE 100 MCG: 10 INJECTION INTRAVENOUS at 12:20

## 2020-10-09 RX ADMIN — PHENYLEPHRINE HYDROCHLORIDE 100 MCG: 10 INJECTION INTRAVENOUS at 12:50

## 2020-10-09 RX ADMIN — FAMOTIDINE 20 MG: 10 INJECTION INTRAVENOUS at 11:51

## 2020-10-09 RX ADMIN — HYDROMORPHONE HYDROCHLORIDE 0.5 MG: 1 INJECTION, SOLUTION INTRAMUSCULAR; INTRAVENOUS; SUBCUTANEOUS at 14:25

## 2020-10-09 RX ADMIN — PHENYLEPHRINE HYDROCHLORIDE 100 MCG: 10 INJECTION INTRAVENOUS at 12:15

## 2020-10-09 RX ADMIN — SODIUM CHLORIDE, POTASSIUM CHLORIDE, SODIUM LACTATE AND CALCIUM CHLORIDE: 600; 310; 30; 20 INJECTION, SOLUTION INTRAVENOUS at 13:03

## 2020-10-09 RX ADMIN — SODIUM CHLORIDE, POTASSIUM CHLORIDE, SODIUM LACTATE AND CALCIUM CHLORIDE 125 ML/HR: 600; 310; 30; 20 INJECTION, SOLUTION INTRAVENOUS at 11:31

## 2020-10-09 RX ADMIN — ONDANSETRON 4 MG: 2 INJECTION INTRAMUSCULAR; INTRAVENOUS at 11:51

## 2020-10-09 RX ADMIN — ACETAMINOPHEN 1000 MG: 500 TABLET, FILM COATED ORAL at 11:51

## 2020-10-09 RX ADMIN — PHENYLEPHRINE HYDROCHLORIDE 100 MCG: 10 INJECTION INTRAVENOUS at 12:24

## 2020-10-09 RX ADMIN — IBUPROFEN 800 MG: 800 TABLET ORAL at 17:31

## 2020-10-09 RX ADMIN — PHENYLEPHRINE HYDROCHLORIDE 100 MCG: 10 INJECTION INTRAVENOUS at 12:42

## 2020-10-09 RX ADMIN — DIPHENHYDRAMINE HYDROCHLORIDE 25 MG: 50 INJECTION, SOLUTION INTRAMUSCULAR; INTRAVENOUS at 14:19

## 2020-10-09 RX ADMIN — Medication: at 17:31

## 2020-10-09 RX ADMIN — EPHEDRINE SULFATE 10 MG: 50 INJECTION INTRAVENOUS at 12:42

## 2020-10-09 RX ADMIN — DIPHENHYDRAMINE HYDROCHLORIDE 25 MG: 25 CAPSULE ORAL at 20:50

## 2020-10-09 RX ADMIN — OXYCODONE HYDROCHLORIDE AND ACETAMINOPHEN 2 TABLET: 5; 325 TABLET ORAL at 20:51

## 2020-10-09 RX ADMIN — HYDROXYZINE HYDROCHLORIDE 25 MG: 50 TABLET ORAL at 23:13

## 2020-10-09 RX ADMIN — CEFAZOLIN SODIUM 2 G: 2 INJECTION, SOLUTION INTRAVENOUS at 11:56

## 2020-10-09 RX ADMIN — OXYTOCIN 125 ML/HR: 10 INJECTION INTRAVENOUS at 14:12

## 2020-10-09 RX ADMIN — SODIUM CHLORIDE, POTASSIUM CHLORIDE, SODIUM LACTATE AND CALCIUM CHLORIDE 1000 ML: 600; 310; 30; 20 INJECTION, SOLUTION INTRAVENOUS at 10:40

## 2020-10-09 RX ADMIN — PHENYLEPHRINE HYDROCHLORIDE 100 MCG: 10 INJECTION INTRAVENOUS at 12:38

## 2020-10-09 NOTE — ANESTHESIA PREPROCEDURE EVALUATION
Anesthesia Evaluation     NPO Solid Status: > 8 hours             Airway   Mallampati: II  TM distance: >3 FB  Neck ROM: full  No difficulty expected  Dental      Pulmonary - negative pulmonary ROS and normal exam   Cardiovascular     Rhythm: regular    (+) hypertension,       Neuro/Psych  GI/Hepatic/Renal/Endo      Musculoskeletal     Abdominal    Substance History      OB/GYN    (+) Pregnant,     Comment: 37 1/7      Other                        Anesthesia Plan    ASA 2     spinal   (Discussed with patient SAB and risk of PDPH, the possible need for blood patch in the future, discomfort during placement or delivery, SOA, possible failed SAB and need for GETA and the risks associated with GA and CS, N&V, and itching from intrathecal morphine.  Patient understands and wishes to have SAB for CS.)

## 2020-10-09 NOTE — ANESTHESIA PROCEDURE NOTES
Spinal Block      Patient location during procedure: OR  Start Time: 10/9/2020 12:15 PM  Indication:at surgeon's request  Performed By  Anesthesiologist: Cirilo Wyatt MD  CRNA: Anabella Stiles CRNA  Preanesthetic Checklist  Completed: patient identified, site marked, surgical consent, pre-op evaluation, timeout performed, IV checked, risks and benefits discussed and monitors and equipment checked  Spinal Block Prep:  Patient Position:sitting  Sterile Tech:cap

## 2020-10-09 NOTE — H&P
Patient is a 30 y.o. female admitted at 37+ weeks for repeat  with tubal sterilization, secondary to IUGR with increasing vascular resistance on fetal doppler studies.    Patient Active Problem List   Diagnosis   • Previous  section   • Subchorionic hemorrhage of placenta in first trimester, single or unspecified fetus   • Pregnancy     Prenatal care is complicated by IUGR    Past Medical History:   Diagnosis Date   • Chlamydia     not current- was negative on 3/23/20   • Gonorrhea     not current- was negative on 3/23/20   • Preeclampsia     pt denies any knowledge of a history of pre-eclampsia   • Substance abuse (CMS/[facility])     THC+ 3/23/20       Past Surgical History:   Procedure Laterality Date   •  SECTION      x4       No Known Allergies    Social History     Socioeconomic History   • Marital status: Single     Spouse name: Not on file   • Number of children: Not on file   • Years of education: Not on file   • Highest education level: Not on file   Tobacco Use   • Smoking status: Never Smoker   Substance and Sexual Activity   • Alcohol use: Not Currently   • Drug use: Yes     Types: Marijuana     Comment: reports she quit in january or february   • Sexual activity: Yes     Birth control/protection: None       Physical Exam  Gen: Alert and pleasant.  Vitals:    10/09/20 0946   BP:    Pulse:    Resp:    Temp:    SpO2: 100%     HEENT: WNL  Abdomen: Gravid  FHT: Category 1    Assessment/Plan: IUP at 37 weeks with IUGR, previous  and desires tubal sterilization  - Patient for repeat .  She has been followed for IUGR with worsening dopplers; while she has not had absent or reverse flow, doppler studies show significant increase in vascular resistance.  - Fetal status reassuring overall.      Genaro Hamlin MD

## 2020-10-09 NOTE — PLAN OF CARE
Problem: Adult Inpatient Plan of Care  Goal: Patient-Specific Goal (Individualized)  Outcome: Ongoing, Not Progressing  Flowsheets (Taken 10/9/2020 1403)  Patient-Specific Goals (Include Timeframe): healthy baby  Individualized Care Needs: this is pts 6th child, it is her 2nd girl, her oldest is 11yo. Guevara is the FOB, he is not the FOB of any of her other kids, it is his 3rd child. pt would like assistance in applying for WIC.  Anxieties, Fears or Concerns: concerned about infants wellbeing   Goal Outcome Evaluation:  Plan of Care Reviewed With: patient, significant other  Progress: improving  Outcome Summary: s/p repeat  with bilateral tubal ligation. bleeding is light, pt reports her pain a 3/10 currently and is sleeping between care, infant is in nursery for transition.

## 2020-10-09 NOTE — OP NOTE
Operative Note    Procedure: Repeat low transverse  delivery with bilateral salpingectomy (fimbriectomy)    Surgeon: Genaro Hamlin MD    Assistant: David Braun MD - Dr Braun provided important assisting role with retraction, help with hemostasis and assistance with delivery of infant.    Preop diagnosis: IUP at 37 weeks.  Intrauterine Growth Restriction.  Previous .  Desires Sterilization.    Postop diagnosis: Same    Indications: Patient is a 30 year old  with previous  times 4.  Patient developed IUGR during third trimester of pregnancy.   testing was stable until recently with change in umbilical Doppler studies.  Vascular resistance was noted to be significantly increased and delivery was undertaken.    Findings: Female infant, Apgar 7 at one minute 4 at 5 minutes and 8 at 10 minutes, Weight 5lb 9oz    Significant adhesions involving fascia to rectus muscles and lower uterine segment to bladder and rectus tissues.    Cord gases: Not performed    Anesthesia: Spinal    ESTIMATED BLOOD LOSS: 900 cc    SPECIMENS:   Order Name Source Comment Collection Info Order Time   TISSUE PATHOLOGY EXAM Fallopian Tube, Left  Collected By: Genaro Hamlin MD 10/9/2020  1:06 PM       Pathology: Bilateral tubes and placenta    Complications: None    Procedure: Patient was taken to operating room. After adequate regional  anesthesia was administered/dosed, patient was placed on OR table in dorsal supine position with a left tilt. Abdomen was prepped and draped in a normal, sterile fashion. Patient identified with two identifiers and timeout performed with all team members and patient agreeing to the planned procedure.  It was confirmed that the patient had received Kefzol 2 grams prior to the start of the incision.    A Pfannenstiel incision made with the knife and taken through the subcutaneous tissue to the fascia with the knife. The fascia scored in the midline and the  "incision was extended laterally with curved Washburn scissors.  There were dense adhesions involving fascial layer with underlying rectus muscles.  The superior rectus fascia was grasped with Kocher clamps times two and divided off the underlying rectus muscles. This was repeated on the inferior aspect. The rectus muscles were then  in the midline and the parietal peritoneum was entered digitally.  Of note, the lower uterine segment was ruptured and only thin amnion was noted.     The \"window\" in the ruptured lower uterine segment was extended laterally with finger fraction. The head was found to be cephalic and was delivered through the uterine incision. The oropharynx and nares were bulb-suctioned, the cord was clamped times two and cut, and the infant handed to the awaiting pediatricians. The infant was vigorous initially but developed transient apnea at five minutes that improved by 10 minutes. Cord blood was then collected. The placenta delivered spontaneously intact.  A three vessel cord was noted.    The uterus was delivered onto the maternal abdomen and wiped clean of clots and debris.  There were dense adhesions involving rectus to the right side of the uterus and the bladder to the left side of uterus.  The uterine incision was then closed in two layers of 0 Monocryl suture, the first layer in a running locked fashion and the second layer imbricating the first. Good hemostasis was noted.  Attention was directed to the sterilization portion of the surgery.    Each tube was identified, mobilized with a Belle clamp and the distal end of the tube was clamped with a curved Magda clamp.  The distal end of the tube was removed and doubly ligated with 0 Plaingut suture.  Each segment was sent for permanent pathology and the site was noted to be hemostatic.    The uterine incision was inspected and noted to be hemostatic.  The posterior cul de sac was inspected, irrigated with saline, cleared of all " clots/debris and the uterus was returned to the abdomen. The pelvic cavity was wiped clean of clots. The uterine incision was again inspected and found to be hemostatic.  The subfascial space was inspected and noted to be hemostatic.  The fascia was closed with 0 looped PDS in a running fascia. The subcutaneous tissue was irrigated and made hemostatic with the Bovie. The skin was closed with 4-0 monocryl in a subcuticular fashion. Dermabond and a sterile dressing was applied.    Counts for needles, sponges, laps and instruments were correct times two at the end of the procedure. I was present and scrubbed for the entire procedure. There were no major complications. The patient was transported to the recovery area in stable condition. Mother and baby were bonding well at the end of the procedure.    Disposition:  To PACU      Genaro Hamlin MD

## 2020-10-09 NOTE — ANESTHESIA POSTPROCEDURE EVALUATION
"Patient: Елена Quan    Procedure Summary     Date: 10/09/20 Room / Location:  [facility]    Anesthesia Start: 1210 Anesthesia Stop: 1329    Procedure:  SECTION REPEAT WITH TUBAL (N/A Abdomen) Diagnosis:     Surgeon: Genaro Hamlin MD Provider: Render, Cirilo Amaral MD    Anesthesia Type: spinal ASA Status: 2          Anesthesia Type: spinal    Vitals  Vitals Value Taken Time   /58 10/09/20 1830   Temp 36.4 °C (97.5 °F) 10/09/20 1830   Pulse 80 10/09/20 1830   Resp 16 10/09/20 1830   SpO2 100 % 10/09/20 1830           Post Anesthesia Care and Evaluation    Patient location during evaluation: PACU  Level of consciousness: awake and alert  Pain management: adequate  Anesthetic complications: No anesthetic complications    Cardiovascular status: acceptable  Respiratory status: acceptable    Comments: /58 (BP Location: Left arm, Patient Position: Lying)   Pulse 80   Temp 36.4 °C (97.5 °F) (Oral)   Resp 16   Ht 157.5 cm (62\")   Wt 79.9 kg (176 lb 3.2 oz)   LMP 2020 (Exact Date)   SpO2 100%   Breastfeeding Yes   BMI 32.23 kg/m²         "

## 2020-10-10 LAB
BUPRENORPHINE UR QL: NEGATIVE NG/ML
CYTO UR: NORMAL
DEPRECATED RDW RBC AUTO: 36.2 FL (ref 37–54)
EOSINOPHIL # BLD MANUAL: 0.15 10*3/MM3 (ref 0–0.4)
EOSINOPHIL NFR BLD MANUAL: 2 % (ref 0.3–6.2)
ERYTHROCYTE [DISTWIDTH] IN BLOOD BY AUTOMATED COUNT: 11.8 % (ref 12.3–15.4)
HCT VFR BLD AUTO: 34.6 % (ref 34–46.6)
HGB BLD-MCNC: 10.9 G/DL (ref 12–15.9)
LAB AP CASE REPORT: NORMAL
LYMPHOCYTES # BLD MANUAL: 0.66 10*3/MM3 (ref 0.7–3.1)
LYMPHOCYTES NFR BLD MANUAL: 5 % (ref 5–12)
LYMPHOCYTES NFR BLD MANUAL: 9 % (ref 19.6–45.3)
MCH RBC QN AUTO: 26.5 PG (ref 26.6–33)
MCHC RBC AUTO-ENTMCNC: 31.5 G/DL (ref 31.5–35.7)
MCV RBC AUTO: 84 FL (ref 79–97)
MONOCYTES # BLD AUTO: 0.37 10*3/MM3 (ref 0.1–0.9)
NEUTROPHILS # BLD AUTO: 6.16 10*3/MM3 (ref 1.7–7)
NEUTROPHILS NFR BLD MANUAL: 84 % (ref 42.7–76)
PATH REPORT.FINAL DX SPEC: NORMAL
PATH REPORT.GROSS SPEC: NORMAL
PLAT MORPH BLD: NORMAL
PLATELET # BLD AUTO: 163 10*3/MM3 (ref 140–450)
PMV BLD AUTO: 9.5 FL (ref 6–12)
RBC # BLD AUTO: 4.12 10*6/MM3 (ref 3.77–5.28)
RBC MORPH BLD: NORMAL
WBC # BLD AUTO: 7.33 10*3/MM3 (ref 3.4–10.8)
WBC MORPH BLD: NORMAL

## 2020-10-10 PROCEDURE — 85027 COMPLETE CBC AUTOMATED: CPT | Performed by: OBSTETRICS & GYNECOLOGY

## 2020-10-10 PROCEDURE — 63710000001 DIPHENHYDRAMINE PER 50 MG: Performed by: OBSTETRICS & GYNECOLOGY

## 2020-10-10 PROCEDURE — 85007 BL SMEAR W/DIFF WBC COUNT: CPT | Performed by: OBSTETRICS & GYNECOLOGY

## 2020-10-10 RX ADMIN — Medication 1 TABLET: at 09:17

## 2020-10-10 RX ADMIN — IBUPROFEN 800 MG: 800 TABLET ORAL at 01:44

## 2020-10-10 RX ADMIN — OXYCODONE HYDROCHLORIDE AND ACETAMINOPHEN 2 TABLET: 5; 325 TABLET ORAL at 19:31

## 2020-10-10 RX ADMIN — SIMETHICONE CHEW TAB 80 MG 80 MG: 80 TABLET ORAL at 01:51

## 2020-10-10 RX ADMIN — OXYCODONE HYDROCHLORIDE AND ACETAMINOPHEN 2 TABLET: 5; 325 TABLET ORAL at 12:52

## 2020-10-10 RX ADMIN — IBUPROFEN 800 MG: 800 TABLET ORAL at 09:17

## 2020-10-10 RX ADMIN — SIMETHICONE CHEW TAB 80 MG 80 MG: 80 TABLET ORAL at 19:31

## 2020-10-10 RX ADMIN — IBUPROFEN 800 MG: 800 TABLET ORAL at 19:00

## 2020-10-10 RX ADMIN — SIMETHICONE CHEW TAB 80 MG 80 MG: 80 TABLET ORAL at 12:54

## 2020-10-10 RX ADMIN — OXYCODONE HYDROCHLORIDE AND ACETAMINOPHEN 2 TABLET: 5; 325 TABLET ORAL at 05:05

## 2020-10-10 RX ADMIN — DIPHENHYDRAMINE HYDROCHLORIDE 25 MG: 25 CAPSULE ORAL at 01:44

## 2020-10-10 NOTE — LACTATION NOTE
P6. Baby girl in NICU. Patient started on HGP with no drops seen. Maternal hydration encouraged.   Lactation Consult Note    Evaluation Completed: 10/9/2020 20:19 EDT  Patient Name: Елена Quan  :  1990  MRN:  [id number]     REFERRAL  INFORMATION:                          Date of Referral: 10/09/20   Person Making Referral: lactation consultant  Maternal Reason for Referral: breastfeeding currently, separation from infant  Infant Reason for Referral: 35-37 weeks gestation, NICU admission, low birth weight    DELIVERY HISTORY:        Skin to skin initiation date/time: 10/9/2020     Skin to skin end date/time:           MATERNAL ASSESSMENT:  Breast Size Issue: none (10/09/20 2015 : Kesha Rodriguez, RN)  Breast Shape: round (10/09/20 2015 : Kesha Rodriguez RN)  Breast Density: soft (10/09/20 2015 : Kesha Rodriguez, RN)     Nipples: everted (10/09/20 2015 : Kesha Rodriguez RN)  Nipple Width: 1.5 cm (10/09/20 2015 : Kesha Rodriguez RN)             INFANT ASSESSMENT:  Information for the patient's :  Olga Quan [[id number]]   No past medical history on file.                                                                                                     MATERNAL INFANT FEEDING:  Maternal Preparation: hand hygiene (10/09/20 2015 : Kesha Rodriguez RN)  Maternal Emotional State: receptive (10/09/20 2015 : Kesha Rodriguez, RN)                     Milk Ejection Reflex: absent (10/09/20 2015 : Kesha Rodriguez RN)                                           EQUIPMENT TYPE:  Breast Pump Type: double electric, hospital grade, double electric, personal (10/09/20 2015 : Kesha Rodriguez, RN)  Breast Pump Flange Type: hard (10/09/20 2015 : Kesha Rodriguez, RN)  Breast Pump Flange Size: 24 mm, 27 mm (10/09/20 2015 : Kesha Rodriguez, RN)                        BREAST PUMPING:  Breast Pumping Interventions: early pumping promoted, frequent pumping encouraged  (10/09/20 2015 : Kesha Rodriguez, RN)  Breast Pumping: bilateral breasts pumped until soft, double electric breast pump utilized (10/09/20 2015 : Kesha Rodriguez RN)    LACTATION REFERRALS:

## 2020-10-10 NOTE — PROGRESS NOTES
Section Progress Note    Assessment/Plan     Status post  section: Doing well postoperatively.     Continue current care.    Rh status: O positive  Rubella: Immune  Gender: Female    Subjective     Postpartum Day 1:  Delivery    The patient feels well. The patient denies emotional concerns. Pain is well controlled with current medications. The baby is in NICU.Urinary output is adequate. The patient is ambulating well. The patient is tolerating a normal diet.     Objective     Vital signs in last 24 hours:  Temp:  [97 °F (36.1 °C)-98.2 °F (36.8 °C)] 97.7 °F (36.5 °C)  Heart Rate:  [66-85] 68  Resp:  [16-20] 18  BP: ()/(58-76) 116/76      General:    alert, appears stated age and cooperative   Bowel Sounds:  active   Lochia:  appropriate   Uterine Fundus:   firm   Incision:  clean bandage in place   DVT Evaluation:  No evidence of DVT seen on physical exam.     Lab Results   Component Value Date    WBC 7.33 10/10/2020    HGB 10.9 (L) 10/10/2020    HCT 34.6 10/10/2020    MCV 84.0 10/10/2020     10/10/2020         Crystal Hopper MD  10/10/2020  15:55 EDT

## 2020-10-10 NOTE — PROGRESS NOTES
"Discharge Planning Assessment  [facility]     Patient Name: Елена Quan  MRN: [id number]  Today's Date: 10/10/2020    Admit Date: 10/9/2020    Discharge Needs Assessment    No documentation.       Discharge Plan     Row Name 10/10/20 1519       Plan    Plan  Infant to discharge home with mother when medically ready. LCSW will follow for cord toxicology results. Figueroa Rebolledo, BRETTW    Plan Comments  Mother: Елена Quan,[id number] ; Infant:Olga Quan, [id number] . LCSW was consulted for \" .THC at first prenatal visit, UDS negative on admission\" LCSW spoke with ANTONIETTA Sterling , who had no additional concerns. Of note, mother had a negative urine toxicology screen on admission.No prenatal urine toxicology screens available in EPIC. Cord toxicology was sent, LCSW will follow for cord toxicology results and will report to CPS if warranted. LCSW met with mother alone at bedside. Mother had just returned from visiting infant in the NICU. Mother verified address, phone and insurance. Mother reports she has spoken with [employer] about adding infant to coverage. Mother reports she has car seat, crib, bassinette, clothes, diapers and other supplies for infant. Mother is not current with [facility] and expressed interest in how to schedule [facility] appointment. Mother reports a good support system in maternal grandmother and family.  Mother plans on infant follow up with [facility] and feels comfortable scheduling appointments and will have transportation to appointments. Mother denies any violence, threats, or feeling unsafe. Mother denies any needs or concerns. LCSW educated mother about cord toxicology being sent and if it is positive a CPS report would be made at that time. Mother denies any substance use. Mother polite and cooperative with LCSW during discussion. Mother denied any other needs or concerns. LCSW provided mother with resources packet and briefly discussed resources in packet. " Packet includes info on WIC, HANDS, infant supplies, domestic violence, transportation, counseling, and general community resources. LCSW will follow for cord toxicology results. Figueroa Rebolledo, [facility]    Final Discharge Disposition Code  01 - home or self-care        Continued Care and Services - Admitted Since 10/9/2020    Coordination has not been started for this encounter.         Demographic Summary     Row Name 10/10/20 1518       General Information    Admission Type  inpatient    Arrived From  home    Referral Source  nursing    Reason for Consult  substance use concerns;community resources        Functional Status    No documentation.       Psychosocial     Row Name 10/10/20 1518       Behavior WDL    Behavior WDL  WDL       Emotion Mood WDL    Emotion/Mood/Affect WDL  WDL       Speech WDL    Speech WDL  WDL       Perceptual State WDL    Perceptual State WDL  WDL       Thought Process WDL    Thought Process WDL  WDL       Intellectual Performance WDL    Intellectual Performance WDL  WDL        Abuse/Neglect     Row Name 10/10/20 1518       Personal Safety    Feels Unsafe at Home or Work/School  no    Feels Threatened by Someone  no    Does Anyone Try to Keep You From Having Contact with Others or Doing Things Outside Your Home?  no    Physical Signs of Abuse Present  no        Legal    No documentation.       Substance Abuse    No documentation.       Patient Forms    No documentation.           Figueroa Rebolledo

## 2020-10-10 NOTE — PLAN OF CARE
Goal Outcome Evaluation:  Plan of Care Reviewed With: patient  Progress: improving  Outcome Summary: Stable, pain controlled with po pain meds, ambulates to NICU often, still using EBP and has voided x 2 since Cath D/C'd

## 2020-10-11 PROCEDURE — 0503F POSTPARTUM CARE VISIT: CPT | Performed by: OBSTETRICS & GYNECOLOGY

## 2020-10-11 RX ORDER — OXYCODONE HYDROCHLORIDE AND ACETAMINOPHEN 5; 325 MG/1; MG/1
2 TABLET ORAL EVERY 4 HOURS PRN
Status: DISCONTINUED | OUTPATIENT
Start: 2020-10-11 | End: 2020-10-13 | Stop reason: HOSPADM

## 2020-10-11 RX ADMIN — OXYCODONE HYDROCHLORIDE AND ACETAMINOPHEN 2 TABLET: 5; 325 TABLET ORAL at 23:34

## 2020-10-11 RX ADMIN — IBUPROFEN 800 MG: 800 TABLET ORAL at 03:45

## 2020-10-11 RX ADMIN — OXYCODONE HYDROCHLORIDE AND ACETAMINOPHEN 2 TABLET: 5; 325 TABLET ORAL at 10:10

## 2020-10-11 RX ADMIN — SIMETHICONE CHEW TAB 80 MG 80 MG: 80 TABLET ORAL at 20:06

## 2020-10-11 RX ADMIN — IBUPROFEN 800 MG: 800 TABLET ORAL at 12:01

## 2020-10-11 RX ADMIN — Medication 1 TABLET: at 10:10

## 2020-10-11 RX ADMIN — OXYCODONE HYDROCHLORIDE AND ACETAMINOPHEN 2 TABLET: 5; 325 TABLET ORAL at 19:20

## 2020-10-11 RX ADMIN — IBUPROFEN 800 MG: 800 TABLET ORAL at 20:06

## 2020-10-11 RX ADMIN — OXYCODONE HYDROCHLORIDE AND ACETAMINOPHEN 2 TABLET: 5; 325 TABLET ORAL at 03:45

## 2020-10-11 RX ADMIN — OXYCODONE HYDROCHLORIDE AND ACETAMINOPHEN 2 TABLET: 5; 325 TABLET ORAL at 14:39

## 2020-10-11 NOTE — PLAN OF CARE
Goal Outcome Evaluation:  Plan of Care Reviewed With: patient  Progress: improving  Outcome Summary: Stable, pain better controlled with Percocet being every 4 hours now, PRN, ambulates in room and to NICU to see baby, voiding WNL, LT incision C/D/I, plans to D/C in AM

## 2020-10-12 RX ORDER — DOCUSATE SODIUM 100 MG/1
100 CAPSULE, LIQUID FILLED ORAL 2 TIMES DAILY
Status: DISCONTINUED | OUTPATIENT
Start: 2020-10-12 | End: 2020-10-13 | Stop reason: HOSPADM

## 2020-10-12 RX ADMIN — Medication 1 TABLET: at 09:43

## 2020-10-12 RX ADMIN — OXYCODONE HYDROCHLORIDE AND ACETAMINOPHEN 2 TABLET: 5; 325 TABLET ORAL at 09:43

## 2020-10-12 RX ADMIN — OXYCODONE HYDROCHLORIDE AND ACETAMINOPHEN 2 TABLET: 5; 325 TABLET ORAL at 03:52

## 2020-10-12 RX ADMIN — DOCUSATE SODIUM 100 MG: 100 CAPSULE ORAL at 20:22

## 2020-10-12 RX ADMIN — OXYCODONE HYDROCHLORIDE AND ACETAMINOPHEN 2 TABLET: 5; 325 TABLET ORAL at 14:03

## 2020-10-12 RX ADMIN — OXYCODONE HYDROCHLORIDE AND ACETAMINOPHEN 2 TABLET: 5; 325 TABLET ORAL at 20:22

## 2020-10-12 RX ADMIN — IBUPROFEN 800 MG: 800 TABLET ORAL at 14:03

## 2020-10-12 RX ADMIN — DOCUSATE SODIUM 100 MG: 100 CAPSULE ORAL at 14:02

## 2020-10-12 RX ADMIN — IBUPROFEN 800 MG: 800 TABLET ORAL at 03:52

## 2020-10-12 NOTE — LACTATION NOTE
P6. Baby girl came out of NICU. HGP is parked and patient is formula feeding. She states she is waiting for ilk to come in and feels heavier today. LC assisted with latching baby but infant would not suckle once nipple was in her mouth. Unable to express milk so used formula to wet baby's lips and still she would not suck.   Patient said she is pumping several times per day but nothing is coming out. Educated on supply and demand and suggested increasing breast stimulation and staying well hydrated.   Lactation Consult Note    Evaluation Completed: 10/12/2020 14:13 EDT  Patient Name: Елена Quan  :  1990  MRN:  [id number]     REFERRAL  INFORMATION:                          Date of Referral: 10/12/20   Person Making Referral: lactation consultant  Maternal Reason for Referral: breastfeeding currently, separation from infant  Infant Reason for Referral: 35-37 weeks gestation, NICU admission, low birth weight    DELIVERY HISTORY:        Skin to skin initiation date/time: 10/9/2020     Skin to skin end date/time:           MATERNAL ASSESSMENT:  Breast Size Issue: none (10/12/20 140 : Kesha Rodriguez RN)  Breast Shape: pendulous (10/12/20 1406 : Kesha Rodriguez, RN)  Breast Density: soft (10/12/20 1406 : Kesha Rodriguez, RN)  Areola: elastic (10/12/20 1406 : Kesha Rodriguez, RN)  Nipples: everted (10/12/20 1406 : Kesha Rodriguez, RN)                INFANT ASSESSMENT:  Information for the patient's :  Olga Quan [[id number]]   No past medical history on file.                                                                                                     MATERNAL INFANT FEEDING:  Maternal Preparation: hand hygiene (10/12/20 1406 : Kesha Rodriguez, RN)  Maternal Emotional State: relaxed (10/12/20 1406 : Kesha Rodriguez, RN)  Infant Positioning: cradle (10/12/20 1406 : Kesha Rodriguez, RN)                  Milk Ejection Reflex: present (10/12/20 1406 :  Kesha Rodriguez, RN)           Latch Assistance: minimal assistance (10/12/20 1406 : Kesha Rodriguez, RN)                               EQUIPMENT TYPE:  Breast Pump Type: double electric, hospital grade (10/12/20 1406 : Kesha Rodriguez, RN)                              BREAST PUMPING:  Breast Pumping Interventions: other (see comments)(insurance pumping 3-4 x per day.) (10/12/20 1406 : Kesha Rodriguez, RN)       LACTATION REFERRALS:

## 2020-10-12 NOTE — PROGRESS NOTES
Section Progress Note    Assessment/Plan     Status post  section: Doing well postoperatively.   Likely discharge tomorrow  Continue current care.    Rh status: O positive  Rubella: Immune  Gender: Female    Subjective     Postpartum Day 3:  Delivery    The patient feels well. The patient denies emotional concerns. Pain is well controlled with current medications. The baby is doing well in room, out of NICU.  The patient is ambulating well. The patient is tolerating a normal diet. She had two episodes of back pain yesterday that only lasted 20-30 seconds . None this morning.      Objective     Vital signs in last 24 hours:  Temp:  [97.7 °F (36.5 °C)-98.3 °F (36.8 °C)] 97.7 °F (36.5 °C)  Heart Rate:  [70-76] 70  Resp:  [16-18] 18  BP: (118-120)/(75-81) 120/81      General:    alert, appears stated age and cooperative   Bowel Sounds:  active   Lochia:  appropriate   Uterine Fundus:   firm   Incision:  no erythema/induration/drainage   DVT Evaluation:  No evidence of DVT seen on physical exam.     Lab Results   Component Value Date    WBC 7.33 10/10/2020    HGB 10.9 (L) 10/10/2020    HCT 34.6 10/10/2020    MCV 84.0 10/10/2020     10/10/2020         Crystal Hopper MD  10/12/2020  10:53 EDT

## 2020-10-13 VITALS
HEIGHT: 62 IN | WEIGHT: 176.2 LBS | OXYGEN SATURATION: 100 % | TEMPERATURE: 98.4 F | HEART RATE: 65 BPM | DIASTOLIC BLOOD PRESSURE: 78 MMHG | SYSTOLIC BLOOD PRESSURE: 115 MMHG | BODY MASS INDEX: 32.42 KG/M2 | RESPIRATION RATE: 16 BRPM

## 2020-10-13 PROCEDURE — 0503F POSTPARTUM CARE VISIT: CPT | Performed by: OBSTETRICS & GYNECOLOGY

## 2020-10-13 RX ORDER — OXYCODONE HYDROCHLORIDE AND ACETAMINOPHEN 5; 325 MG/1; MG/1
1 TABLET ORAL EVERY 4 HOURS PRN
Qty: 15 TABLET | Refills: 0 | Status: SHIPPED | OUTPATIENT
Start: 2020-10-13

## 2020-10-13 RX ORDER — IBUPROFEN 800 MG/1
800 TABLET ORAL EVERY 8 HOURS PRN
Qty: 30 TABLET | Refills: 1 | Status: SHIPPED | OUTPATIENT
Start: 2020-10-13

## 2020-10-13 RX ADMIN — Medication 1 TABLET: at 09:01

## 2020-10-13 RX ADMIN — IBUPROFEN 800 MG: 800 TABLET ORAL at 01:12

## 2020-10-13 RX ADMIN — IBUPROFEN 800 MG: 800 TABLET ORAL at 09:01

## 2020-10-13 RX ADMIN — OXYCODONE HYDROCHLORIDE AND ACETAMINOPHEN 2 TABLET: 5; 325 TABLET ORAL at 01:12

## 2020-10-13 RX ADMIN — OXYCODONE HYDROCHLORIDE AND ACETAMINOPHEN 2 TABLET: 5; 325 TABLET ORAL at 09:01

## 2020-10-13 RX ADMIN — DOCUSATE SODIUM 100 MG: 100 CAPSULE ORAL at 09:01

## 2020-10-13 NOTE — PLAN OF CARE
Goal Outcome Evaluation:  Plan of Care Reviewed With: patient  Progress: improving  Outcome Summary: stable. bottle feeding. pain controlled with po meds. questions answered. no c/o or concerns voiced at present.

## 2020-10-13 NOTE — DISCHARGE SUMMARY
Date of Discharge:  10/13/2020    Discharge Diagnosis: 37 weeks 1 day gestation with history of 4 previous C-sections and IUGR with worsening Dopplers status post repeat low transverse  section and bilateral partial salpingectomy    Presenting Problem/History of Present Illness  Pregnancy [Z34.90]       Hospital Course  Patient is a 30 y.o. female  6 para 5 black female at 37 weeks 1 day gestation with a history of 4 previous C-sections who is being followed with IUGR with worsening Dopplers and therefore was brought to the OR for a repeat low transverse  section and planned bilateral partial salpingectomy.  She delivered a live viable female infant weighing 5 pounds 9 ounces with Apgars of 7 4 and 8 at 1 , 5 and 10 minutes..  Both mother and infant are doing well on her fourth postoperative day and desiring discharge.  Her hemoglobin is stable at 10.9 her blood type a positive and rubella status is immune.  Her incision is healing well and she has good bowel function.  We did discuss the fact that she did have significant adhesions at the lower uterine segment and she should be aware of this for future surgeries.    Procedures Performed  Procedure(s):   SECTION REPEAT WITH TUBAL       Consults:   Consults     No orders found from 9/10/2020 to 10/10/2020.          Pertinent Test Results: As above    Condition on Discharge: Stable    Vital Signs  Temp:  [98 °F (36.7 °C)-98.6 °F (37 °C)] 98.4 °F (36.9 °C)  Heart Rate:  [65-76] 65  Resp:  [16-18] 16  BP: (115-118)/(64-78) 115/78    Physical Exam:   Vital signs stable.  Afebrile.  Lochia scant.  Abdomen soft passing flatus without bowel movement yet.  Incision healing well without infection or bleeding.    Discharge Disposition  Home    Discharge Medications     Discharge Medications      ASK your doctor about these medications      Instructions Start Date   Prenatal  27-1 MG tablet tablet   No dose, route, or frequency recorded.              Discharge Diet:   Regular  Activity at Discharge:   As tolerated  Follow-up Appointments  2 and 6 weeks  Test Results Pending at Discharge       Alex Stauffer MD  10/13/20  11:37 EDT    Time: 11:40 AM

## 2020-10-13 NOTE — NURSING NOTE
Patient in very deep sleep at 0600 rounding. Infant on pt bare chest, unsecured in anyway (moms arms at her sides). Took several attempts to awaken mom from sleep. Pt requesting pain meds once fully awake. Meds being withheld due to excessive somnolence for safety. Ibuprofen & 2 Percoct last given at 0112. Infant in crib.

## 2020-10-16 NOTE — PROGRESS NOTES
Continued Stay Note  [facility]     Patient Name: Елена Quan  MRN: [id number]  Today's Date: 10/16/2020    Admit Date: 10/9/2020    Discharge Plan     Row Name 10/16/20 1037       Plan    Plan  Infant to discharge home with mother when medically ready.  Figueroa Rebolledo LCSW    Plan Comments  On 10/16/20, Cord was returned negative. No CPS report warranted. Figueroa Rebolledo LCSW    Final Discharge Disposition Code  01 - home or self-care        Discharge Codes    No documentation.       Expected Discharge Date and Time     Expected Discharge Date Expected Discharge Time    Oct 13, 2020             Figueroa Rebolledo

## 2020-10-23 ENCOUNTER — OFFICE VISIT (OUTPATIENT)
Dept: OBSTETRICS AND GYNECOLOGY | Facility: CLINIC | Age: 30
End: 2020-10-23

## 2020-10-23 VITALS
DIASTOLIC BLOOD PRESSURE: 80 MMHG | WEIGHT: 164 LBS | HEIGHT: 62 IN | BODY MASS INDEX: 30.18 KG/M2 | SYSTOLIC BLOOD PRESSURE: 134 MMHG

## 2020-10-23 DIAGNOSIS — Z98.890 POST-OPERATIVE STATE: Primary | ICD-10-CM

## 2020-10-23 PROCEDURE — 0503F POSTPARTUM CARE VISIT: CPT | Performed by: OBSTETRICS & GYNECOLOGY

## 2020-10-23 NOTE — PROGRESS NOTES
"Subjective:      Cc:  Follow up to      Елена Quan presents to the clinic 2 weeks following repeat . Eating a regular diet without difficulty. Bowel movements are Normal.  Patient having modest/minimal pain.      Objective:      /80   Ht 157.5 cm (62.01\")   Wt 74.4 kg (164 lb)   LMP 2020 (Exact Date)   Breastfeeding Yes   BMI 29.99 kg/m²     General:  alert, appears stated age and cooperative   Abdomen: soft, non-tender   Incision:   healing well, no drainage, no erythema, no hernia, no seroma, no swelling, no dehiscence, incision well approximated       Assessment:      Doing well postoperatively.      Plan:      1. Continue any current medications.  2. Wound care discussed.  3. No return to work until further notice.  4. Follow up: 4 weeks for postpartum check.      Genaro Hamlin MD  "

## 2020-11-24 ENCOUNTER — TELEPHONE (OUTPATIENT)
Dept: OBSTETRICS AND GYNECOLOGY | Facility: CLINIC | Age: 30
End: 2020-11-24

## 2020-12-07 ENCOUNTER — POSTPARTUM VISIT (OUTPATIENT)
Dept: OBSTETRICS AND GYNECOLOGY | Facility: CLINIC | Age: 30
End: 2020-12-07

## 2020-12-07 VITALS
SYSTOLIC BLOOD PRESSURE: 132 MMHG | BODY MASS INDEX: 30.91 KG/M2 | HEIGHT: 62 IN | WEIGHT: 168 LBS | DIASTOLIC BLOOD PRESSURE: 80 MMHG

## 2020-12-07 DIAGNOSIS — N89.8 VAGINAL ODOR: Primary | ICD-10-CM

## 2020-12-07 PROCEDURE — 99213 OFFICE O/P EST LOW 20 MIN: CPT | Performed by: OBSTETRICS & GYNECOLOGY

## 2020-12-08 NOTE — PROGRESS NOTES
"Papa Quan is a 30 y.o. female who presents for a postpartum visit. She is 6 weeks postpartum following a low cervical transverse  section. I have fully reviewed the prenatal and intrapartum course. The delivery was at 39 gestational weeks. Outcome: repeat  section, low transverse incision. Anesthesia: spinal. Postpartum course has been uncomplicated. Baby's course has been uncomplicated. Baby is feeding by breast and bottle. Bleeding is scant and associated with vaginal odor. Bowel function is normal. Bladder function is normal. Patient is not sexually active. Contraception method is tubal ligation. Postpartum depression screening: negative.    The following portions of the patient's history were reviewed and updated as appropriate:   She  has a past medical history of Chlamydia, Gonorrhea, Preeclampsia, and Substance abuse (CMS/[facility]).  She  reports that she has never smoked. She does not have any smokeless tobacco history on file. She reports previous alcohol use. She reports current drug use. Drug: Marijuana.  Current Outpatient Medications   Medication Sig Dispense Refill   • ibuprofen (ADVIL,MOTRIN) 800 MG tablet Take 1 tablet by mouth Every 8 (Eight) Hours As Needed for Moderate Pain. 30 tablet 1   • oxyCODONE-acetaminophen (PERCOCET) 5-325 MG per tablet Take 1 tablet by mouth Every 4 (Four) Hours As Needed for Severe Pain. 15 tablet 0   • Prenatal Vit-Fe Fumarate-FA (PRENATAL 27-) 27-1 MG tablet tablet        No current facility-administered medications for this visit.      She has No Known Allergies..    Review of Systems  Constitutional: negative for chills and fevers  Gastrointestinal: negative for nausea and vomiting  Genitourinary:negative for dysuria and frequency  Integument/breast: negative for breast lump and breast tenderness  Behavioral/Psych: negative for anxiety and depression    Objective   /80   Ht 157.5 cm (62.01\")   Wt 76.2 kg (168 lb)   LMP " 11/23/2020 (Approximate)   Breastfeeding No   BMI 30.72 kg/m²    General:  alert, appears stated age and cooperative    Breasts:  inspection negative, no nipple discharge or bleeding, no masses or nodularity palpable   Lungs: clear to auscultation bilaterally   Heart:  regular rate and rhythm   Abdomen: normal findings: soft, non-tender    Vulva:  normal   Vagina: normal vagina, copious white discharge, exudate, lesion, or erythema   Cervix:  no cervical motion tenderness   Corpus: normal   Adnexa:  no mass, fullness, tenderness   Rectal Exam: Not performed.     Assessment/Plan   Normal postpartum exam. Pap smear not done at today's visit.    1. Contraception: tubal ligation  2. Vaginal discharge.  Await cultures.  3. Patient can resume normal activities.  3. Follow up in: 1 year or as needed.    Genaro Hamlin MD

## 2020-12-11 RX ORDER — METRONIDAZOLE 500 MG/1
500 TABLET ORAL 2 TIMES DAILY
Qty: 14 TABLET | Refills: 0 | Status: SHIPPED | OUTPATIENT
Start: 2020-12-11 | End: 2020-12-18

## 2021-11-03 NOTE — PROGRESS NOTES
Assessment/Plan:  Status post  section. Doing well postoperatively.     Continue current care.  Probable discharge next 1 to 2 days.    Subjective:  Postpartum Day 2:  Delivery    The patient feels well.  Pain is moderately controlled with current medications. The baby iswell in NICU. . . Urinary output is adequate. The patient is ambulating well. The patient is tolerating a normal diet. Flatus has been passed.    Objective:  Vital signs in last 24 hours:  Temp:  [97.7 °F (36.5 °C)-98 °F (36.7 °C)] 97.7 °F (36.5 °C)  Heart Rate:  [65-75] 65  Resp:  [16-20] 16  BP: ()/(63-76) 99/63      General:    alert, appears stated age and cooperative   Bowel Sounds:  active   Lochia:  appropriate   Uterine Fundus:   firm   Incision:  healing well, no significant drainage, no dehiscence, no significant erythema   DVT Evaluation:  No evidence of DVT seen on physical exam.       Female baby in NICU  Blood type: O +    Rubella: Immune       Nosebleeds Normal Treatment: I explained this is common when taking isotretinoin. I recommended saline mist in each nostril multiple times a day. If this worsens they will contact us.

## (undated) DEVICE — SOL IRR H2O BTL 1000ML STRL

## (undated) DEVICE — 3M(TM) TEGADERM(TM) TRANSPARENT FILM DRESSING FRAME STYLE 1627: Brand: 3M™ TEGADERM™

## (undated) DEVICE — ANTIBACTERIAL UNDYED BRAIDED (POLYGLACTIN 910), SYNTHETIC ABSORBABLE SUTURE: Brand: COATED VICRYL

## (undated) DEVICE — SUT GUT PLN 0 STD TIE 54IN S104H

## (undated) DEVICE — APPL HEMO ENDO SURGICEL 2IN1 1P/U STRL

## (undated) DEVICE — SUT MNCRYL PLS ANTIB UD 4/0 PS2 18IN

## (undated) DEVICE — KENDALL SCD EXPRESS SLEEVES, KNEE LENGTH, MEDIUM: Brand: KENDALL SCD

## (undated) DEVICE — SUT MNCRYL 0/0 CTX 36IN Y398H

## (undated) DEVICE — GLV SURG TRIUMPH CLASSIC PF LTX 7.5 STRL

## (undated) DEVICE — Device